# Patient Record
Sex: MALE | Race: WHITE | NOT HISPANIC OR LATINO | Employment: FULL TIME | URBAN - METROPOLITAN AREA
[De-identification: names, ages, dates, MRNs, and addresses within clinical notes are randomized per-mention and may not be internally consistent; named-entity substitution may affect disease eponyms.]

---

## 2017-01-03 ENCOUNTER — OFFICE VISIT - HEALTHEAST (OUTPATIENT)
Dept: PHYSICAL THERAPY | Facility: CLINIC | Age: 62
End: 2017-01-03

## 2017-01-03 DIAGNOSIS — G44.321 INTRACTABLE CHRONIC POST-TRAUMATIC HEADACHE: ICD-10-CM

## 2017-01-03 DIAGNOSIS — M54.2 CERVICALGIA: ICD-10-CM

## 2017-01-11 ENCOUNTER — OFFICE VISIT - HEALTHEAST (OUTPATIENT)
Dept: PHYSICAL THERAPY | Facility: CLINIC | Age: 62
End: 2017-01-11

## 2017-01-11 DIAGNOSIS — G44.321 INTRACTABLE CHRONIC POST-TRAUMATIC HEADACHE: ICD-10-CM

## 2017-01-11 DIAGNOSIS — M54.2 CERVICALGIA: ICD-10-CM

## 2017-01-11 DIAGNOSIS — F07.81 POST CONCUSSION SYNDROME: ICD-10-CM

## 2017-01-18 ENCOUNTER — OFFICE VISIT - HEALTHEAST (OUTPATIENT)
Dept: PHYSICAL THERAPY | Facility: CLINIC | Age: 62
End: 2017-01-18

## 2017-01-18 DIAGNOSIS — R51.9 HEAD PAIN, CHRONIC: ICD-10-CM

## 2017-01-18 DIAGNOSIS — F07.81 POST CONCUSSION SYNDROME: ICD-10-CM

## 2017-01-18 DIAGNOSIS — G89.29 CHRONIC NECK PAIN: ICD-10-CM

## 2017-01-18 DIAGNOSIS — M54.2 CHRONIC NECK PAIN: ICD-10-CM

## 2017-01-18 DIAGNOSIS — G89.29 HEAD PAIN, CHRONIC: ICD-10-CM

## 2017-01-18 DIAGNOSIS — M54.2 CERVICALGIA: ICD-10-CM

## 2017-01-18 DIAGNOSIS — G44.321 INTRACTABLE CHRONIC POST-TRAUMATIC HEADACHE: ICD-10-CM

## 2017-01-24 ENCOUNTER — OFFICE VISIT - HEALTHEAST (OUTPATIENT)
Dept: PHYSICAL THERAPY | Facility: CLINIC | Age: 62
End: 2017-01-24

## 2017-01-24 DIAGNOSIS — F07.81 POST CONCUSSION SYNDROME: ICD-10-CM

## 2017-01-24 DIAGNOSIS — M54.2 CERVICALGIA: ICD-10-CM

## 2017-01-24 DIAGNOSIS — G44.321 INTRACTABLE CHRONIC POST-TRAUMATIC HEADACHE: ICD-10-CM

## 2017-02-01 ENCOUNTER — OFFICE VISIT - HEALTHEAST (OUTPATIENT)
Dept: PHYSICAL THERAPY | Facility: CLINIC | Age: 62
End: 2017-02-01

## 2017-02-01 DIAGNOSIS — G44.321 INTRACTABLE CHRONIC POST-TRAUMATIC HEADACHE: ICD-10-CM

## 2017-02-01 DIAGNOSIS — F07.81 POST CONCUSSION SYNDROME: ICD-10-CM

## 2017-02-01 DIAGNOSIS — M54.2 CERVICALGIA: ICD-10-CM

## 2017-02-01 DIAGNOSIS — M54.2 CHRONIC NECK PAIN: ICD-10-CM

## 2017-02-01 DIAGNOSIS — G89.29 CHRONIC NECK PAIN: ICD-10-CM

## 2017-02-01 DIAGNOSIS — R51.9 HEAD PAIN, CHRONIC: ICD-10-CM

## 2017-02-01 DIAGNOSIS — G89.29 HEAD PAIN, CHRONIC: ICD-10-CM

## 2017-02-06 ENCOUNTER — OFFICE VISIT - HEALTHEAST (OUTPATIENT)
Dept: PHYSICAL THERAPY | Facility: CLINIC | Age: 62
End: 2017-02-06

## 2017-02-06 DIAGNOSIS — F07.81 POST CONCUSSION SYNDROME: ICD-10-CM

## 2017-02-06 DIAGNOSIS — R51.9 HEAD PAIN, CHRONIC: ICD-10-CM

## 2017-02-06 DIAGNOSIS — M54.2 CHRONIC NECK PAIN: ICD-10-CM

## 2017-02-06 DIAGNOSIS — G89.29 HEAD PAIN, CHRONIC: ICD-10-CM

## 2017-02-06 DIAGNOSIS — G89.29 CHRONIC NECK PAIN: ICD-10-CM

## 2017-02-06 DIAGNOSIS — G44.321 INTRACTABLE CHRONIC POST-TRAUMATIC HEADACHE: ICD-10-CM

## 2017-02-06 DIAGNOSIS — M54.2 CERVICALGIA: ICD-10-CM

## 2017-02-15 ENCOUNTER — OFFICE VISIT - HEALTHEAST (OUTPATIENT)
Dept: PHYSICAL THERAPY | Facility: CLINIC | Age: 62
End: 2017-02-15

## 2017-02-15 DIAGNOSIS — R51.9 HEAD PAIN, CHRONIC: ICD-10-CM

## 2017-02-15 DIAGNOSIS — M54.2 CERVICALGIA: ICD-10-CM

## 2017-02-15 DIAGNOSIS — G89.29 HEAD PAIN, CHRONIC: ICD-10-CM

## 2017-02-15 DIAGNOSIS — G44.321 INTRACTABLE CHRONIC POST-TRAUMATIC HEADACHE: ICD-10-CM

## 2017-02-15 DIAGNOSIS — F07.81 POST CONCUSSION SYNDROME: ICD-10-CM

## 2017-02-15 DIAGNOSIS — G89.29 CHRONIC NECK PAIN: ICD-10-CM

## 2017-02-15 DIAGNOSIS — M54.2 CHRONIC NECK PAIN: ICD-10-CM

## 2017-03-06 ENCOUNTER — OFFICE VISIT - HEALTHEAST (OUTPATIENT)
Dept: PHYSICAL THERAPY | Facility: CLINIC | Age: 62
End: 2017-03-06

## 2017-03-06 DIAGNOSIS — G89.29 CHRONIC NECK PAIN: ICD-10-CM

## 2017-03-06 DIAGNOSIS — G89.29 HEAD PAIN, CHRONIC: ICD-10-CM

## 2017-03-06 DIAGNOSIS — G44.321 INTRACTABLE CHRONIC POST-TRAUMATIC HEADACHE: ICD-10-CM

## 2017-03-06 DIAGNOSIS — M54.2 CHRONIC NECK PAIN: ICD-10-CM

## 2017-03-06 DIAGNOSIS — F07.81 POST CONCUSSION SYNDROME: ICD-10-CM

## 2017-03-06 DIAGNOSIS — M54.2 CERVICALGIA: ICD-10-CM

## 2017-03-06 DIAGNOSIS — R51.9 HEAD PAIN, CHRONIC: ICD-10-CM

## 2017-03-22 ENCOUNTER — OFFICE VISIT - HEALTHEAST (OUTPATIENT)
Dept: PHYSICAL THERAPY | Facility: CLINIC | Age: 62
End: 2017-03-22

## 2017-03-22 DIAGNOSIS — G89.29 CHRONIC NECK PAIN: ICD-10-CM

## 2017-03-22 DIAGNOSIS — M54.2 CERVICALGIA: ICD-10-CM

## 2017-03-22 DIAGNOSIS — F07.81 POST CONCUSSION SYNDROME: ICD-10-CM

## 2017-03-22 DIAGNOSIS — R51.9 HEAD PAIN, CHRONIC: ICD-10-CM

## 2017-03-22 DIAGNOSIS — G44.321 INTRACTABLE CHRONIC POST-TRAUMATIC HEADACHE: ICD-10-CM

## 2017-03-22 DIAGNOSIS — G89.29 HEAD PAIN, CHRONIC: ICD-10-CM

## 2017-03-22 DIAGNOSIS — M54.2 CHRONIC NECK PAIN: ICD-10-CM

## 2017-04-03 ENCOUNTER — OFFICE VISIT - HEALTHEAST (OUTPATIENT)
Dept: PHYSICAL THERAPY | Facility: CLINIC | Age: 62
End: 2017-04-03

## 2017-04-03 DIAGNOSIS — R51.9 HEAD PAIN, CHRONIC: ICD-10-CM

## 2017-04-03 DIAGNOSIS — M54.2 CHRONIC NECK PAIN: ICD-10-CM

## 2017-04-03 DIAGNOSIS — M54.2 CERVICALGIA: ICD-10-CM

## 2017-04-03 DIAGNOSIS — G89.29 HEAD PAIN, CHRONIC: ICD-10-CM

## 2017-04-03 DIAGNOSIS — F07.81 POST CONCUSSION SYNDROME: ICD-10-CM

## 2017-04-03 DIAGNOSIS — G44.321 INTRACTABLE CHRONIC POST-TRAUMATIC HEADACHE: ICD-10-CM

## 2017-04-03 DIAGNOSIS — G89.29 CHRONIC NECK PAIN: ICD-10-CM

## 2017-04-17 ENCOUNTER — OFFICE VISIT - HEALTHEAST (OUTPATIENT)
Dept: PHYSICAL THERAPY | Facility: CLINIC | Age: 62
End: 2017-04-17

## 2017-04-17 DIAGNOSIS — F07.81 POST CONCUSSION SYNDROME: ICD-10-CM

## 2017-04-17 DIAGNOSIS — G44.321 INTRACTABLE CHRONIC POST-TRAUMATIC HEADACHE: ICD-10-CM

## 2017-04-17 DIAGNOSIS — G89.29 HEAD PAIN, CHRONIC: ICD-10-CM

## 2017-04-17 DIAGNOSIS — M54.2 CERVICALGIA: ICD-10-CM

## 2017-04-17 DIAGNOSIS — G89.29 CHRONIC NECK PAIN: ICD-10-CM

## 2017-04-17 DIAGNOSIS — M54.2 CHRONIC NECK PAIN: ICD-10-CM

## 2017-04-17 DIAGNOSIS — R51.9 HEAD PAIN, CHRONIC: ICD-10-CM

## 2017-06-14 ENCOUNTER — OFFICE VISIT - HEALTHEAST (OUTPATIENT)
Dept: PHYSICAL THERAPY | Facility: CLINIC | Age: 62
End: 2017-06-14

## 2017-06-14 DIAGNOSIS — M54.2 CERVICALGIA: ICD-10-CM

## 2017-06-14 DIAGNOSIS — F07.81 POST CONCUSSION SYNDROME: ICD-10-CM

## 2017-06-14 DIAGNOSIS — G44.319 ACUTE POST-TRAUMATIC HEADACHE, NOT INTRACTABLE: ICD-10-CM

## 2017-06-14 DIAGNOSIS — G89.29 CHRONIC NECK PAIN: ICD-10-CM

## 2017-06-14 DIAGNOSIS — G89.29 HEAD PAIN, CHRONIC: ICD-10-CM

## 2017-06-14 DIAGNOSIS — M54.2 CHRONIC NECK PAIN: ICD-10-CM

## 2017-06-14 DIAGNOSIS — R51.9 HEAD PAIN, CHRONIC: ICD-10-CM

## 2017-06-19 ENCOUNTER — OFFICE VISIT - HEALTHEAST (OUTPATIENT)
Dept: PHYSICAL THERAPY | Facility: CLINIC | Age: 62
End: 2017-06-19

## 2017-06-19 DIAGNOSIS — G89.29 CHRONIC NECK PAIN: ICD-10-CM

## 2017-06-19 DIAGNOSIS — M54.2 CERVICALGIA: ICD-10-CM

## 2017-06-19 DIAGNOSIS — F07.81 POST CONCUSSION SYNDROME: ICD-10-CM

## 2017-06-19 DIAGNOSIS — G44.319 ACUTE POST-TRAUMATIC HEADACHE, NOT INTRACTABLE: ICD-10-CM

## 2017-06-19 DIAGNOSIS — M54.2 CHRONIC NECK PAIN: ICD-10-CM

## 2017-07-12 ENCOUNTER — OFFICE VISIT - HEALTHEAST (OUTPATIENT)
Dept: PHYSICAL THERAPY | Facility: REHABILITATION | Age: 62
End: 2017-07-12

## 2017-07-12 DIAGNOSIS — G44.319 ACUTE POST-TRAUMATIC HEADACHE, NOT INTRACTABLE: ICD-10-CM

## 2017-07-12 DIAGNOSIS — M54.2 CERVICALGIA: ICD-10-CM

## 2017-07-12 DIAGNOSIS — G89.29 HEAD PAIN, CHRONIC: ICD-10-CM

## 2017-07-12 DIAGNOSIS — R51.9 HEAD PAIN, CHRONIC: ICD-10-CM

## 2017-07-12 DIAGNOSIS — M54.2 CHRONIC NECK PAIN: ICD-10-CM

## 2017-07-12 DIAGNOSIS — F07.81 POST CONCUSSION SYNDROME: ICD-10-CM

## 2017-07-12 DIAGNOSIS — G89.29 CHRONIC NECK PAIN: ICD-10-CM

## 2017-07-26 ENCOUNTER — OFFICE VISIT - HEALTHEAST (OUTPATIENT)
Dept: PHYSICAL THERAPY | Facility: REHABILITATION | Age: 62
End: 2017-07-26

## 2017-07-26 DIAGNOSIS — G89.29 CHRONIC NECK PAIN: ICD-10-CM

## 2017-07-26 DIAGNOSIS — M54.2 CERVICALGIA: ICD-10-CM

## 2017-07-26 DIAGNOSIS — G89.29 HEAD PAIN, CHRONIC: ICD-10-CM

## 2017-07-26 DIAGNOSIS — F07.81 POST CONCUSSION SYNDROME: ICD-10-CM

## 2017-07-26 DIAGNOSIS — R51.9 HEAD PAIN, CHRONIC: ICD-10-CM

## 2017-07-26 DIAGNOSIS — M54.2 CHRONIC NECK PAIN: ICD-10-CM

## 2017-08-10 ENCOUNTER — OFFICE VISIT - HEALTHEAST (OUTPATIENT)
Dept: PHYSICAL THERAPY | Facility: REHABILITATION | Age: 62
End: 2017-08-10

## 2017-08-10 DIAGNOSIS — M54.2 CHRONIC NECK PAIN: ICD-10-CM

## 2017-08-10 DIAGNOSIS — G44.221 CHRONIC TENSION-TYPE HEADACHE, INTRACTABLE: ICD-10-CM

## 2017-08-10 DIAGNOSIS — G44.321 INTRACTABLE CHRONIC POST-TRAUMATIC HEADACHE: ICD-10-CM

## 2017-08-10 DIAGNOSIS — M54.2 CERVICALGIA: ICD-10-CM

## 2017-08-10 DIAGNOSIS — R51.9 HEAD PAIN, CHRONIC: ICD-10-CM

## 2017-08-10 DIAGNOSIS — G89.29 HEAD PAIN, CHRONIC: ICD-10-CM

## 2017-08-10 DIAGNOSIS — F07.81 POST CONCUSSION SYNDROME: ICD-10-CM

## 2017-08-10 DIAGNOSIS — G89.29 CHRONIC NECK PAIN: ICD-10-CM

## 2017-09-06 ENCOUNTER — OFFICE VISIT - HEALTHEAST (OUTPATIENT)
Dept: PHYSICAL THERAPY | Facility: REHABILITATION | Age: 62
End: 2017-09-06

## 2017-09-06 DIAGNOSIS — F07.81 POST CONCUSSION SYNDROME: ICD-10-CM

## 2017-09-06 DIAGNOSIS — G89.29 HEAD PAIN, CHRONIC: ICD-10-CM

## 2017-09-06 DIAGNOSIS — G89.29 CHRONIC NECK PAIN: ICD-10-CM

## 2017-09-06 DIAGNOSIS — M54.2 CERVICALGIA: ICD-10-CM

## 2017-09-06 DIAGNOSIS — R51.9 HEAD PAIN, CHRONIC: ICD-10-CM

## 2017-09-06 DIAGNOSIS — G44.321 INTRACTABLE CHRONIC POST-TRAUMATIC HEADACHE: ICD-10-CM

## 2017-09-06 DIAGNOSIS — M54.2 CHRONIC NECK PAIN: ICD-10-CM

## 2017-09-20 ENCOUNTER — OFFICE VISIT - HEALTHEAST (OUTPATIENT)
Dept: PHYSICAL THERAPY | Facility: REHABILITATION | Age: 62
End: 2017-09-20

## 2017-09-20 DIAGNOSIS — G44.321 INTRACTABLE CHRONIC POST-TRAUMATIC HEADACHE: ICD-10-CM

## 2017-09-20 DIAGNOSIS — F07.81 POST CONCUSSION SYNDROME: ICD-10-CM

## 2017-09-20 DIAGNOSIS — R51.9 HEAD PAIN, CHRONIC: ICD-10-CM

## 2017-09-20 DIAGNOSIS — M54.2 CERVICALGIA: ICD-10-CM

## 2017-09-20 DIAGNOSIS — M54.2 CHRONIC NECK PAIN: ICD-10-CM

## 2017-09-20 DIAGNOSIS — G89.29 HEAD PAIN, CHRONIC: ICD-10-CM

## 2017-09-20 DIAGNOSIS — G89.29 CHRONIC NECK PAIN: ICD-10-CM

## 2017-10-04 ENCOUNTER — OFFICE VISIT - HEALTHEAST (OUTPATIENT)
Dept: PHYSICAL THERAPY | Facility: REHABILITATION | Age: 62
End: 2017-10-04

## 2017-10-04 DIAGNOSIS — G89.29 HEAD PAIN, CHRONIC: ICD-10-CM

## 2017-10-04 DIAGNOSIS — G89.29 CHRONIC NECK PAIN: ICD-10-CM

## 2017-10-04 DIAGNOSIS — G44.321 INTRACTABLE CHRONIC POST-TRAUMATIC HEADACHE: ICD-10-CM

## 2017-10-04 DIAGNOSIS — M54.2 CHRONIC NECK PAIN: ICD-10-CM

## 2017-10-04 DIAGNOSIS — F07.81 POST CONCUSSION SYNDROME: ICD-10-CM

## 2017-10-04 DIAGNOSIS — R51.9 HEAD PAIN, CHRONIC: ICD-10-CM

## 2017-10-04 DIAGNOSIS — M54.2 CERVICALGIA: ICD-10-CM

## 2017-10-16 ENCOUNTER — OFFICE VISIT - HEALTHEAST (OUTPATIENT)
Dept: PHYSICAL THERAPY | Facility: REHABILITATION | Age: 62
End: 2017-10-16

## 2017-10-16 DIAGNOSIS — G89.29 HEAD PAIN, CHRONIC: ICD-10-CM

## 2017-10-16 DIAGNOSIS — M54.2 CHRONIC NECK PAIN: ICD-10-CM

## 2017-10-16 DIAGNOSIS — G89.29 CHRONIC NECK PAIN: ICD-10-CM

## 2017-10-16 DIAGNOSIS — G44.221 CHRONIC TENSION-TYPE HEADACHE, INTRACTABLE: ICD-10-CM

## 2017-10-16 DIAGNOSIS — F07.81 POST CONCUSSION SYNDROME: ICD-10-CM

## 2017-10-16 DIAGNOSIS — M54.2 CERVICALGIA: ICD-10-CM

## 2017-10-16 DIAGNOSIS — R51.9 HEAD PAIN, CHRONIC: ICD-10-CM

## 2017-11-01 ENCOUNTER — OFFICE VISIT - HEALTHEAST (OUTPATIENT)
Dept: PHYSICAL THERAPY | Facility: REHABILITATION | Age: 62
End: 2017-11-01

## 2017-11-01 DIAGNOSIS — G89.29 CHRONIC NECK PAIN: ICD-10-CM

## 2017-11-01 DIAGNOSIS — M54.2 CHRONIC NECK PAIN: ICD-10-CM

## 2017-11-01 DIAGNOSIS — F07.81 POST CONCUSSION SYNDROME: ICD-10-CM

## 2017-11-01 DIAGNOSIS — M54.2 CERVICALGIA: ICD-10-CM

## 2017-11-01 DIAGNOSIS — G89.29 HEAD PAIN, CHRONIC: ICD-10-CM

## 2017-11-01 DIAGNOSIS — R51.9 HEAD PAIN, CHRONIC: ICD-10-CM

## 2017-11-01 DIAGNOSIS — G44.221 CHRONIC TENSION-TYPE HEADACHE, INTRACTABLE: ICD-10-CM

## 2017-11-15 ENCOUNTER — OFFICE VISIT - HEALTHEAST (OUTPATIENT)
Dept: PHYSICAL THERAPY | Facility: REHABILITATION | Age: 62
End: 2017-11-15

## 2017-11-15 DIAGNOSIS — G44.319 ACUTE POST-TRAUMATIC HEADACHE, NOT INTRACTABLE: ICD-10-CM

## 2017-11-15 DIAGNOSIS — F07.81 POST CONCUSSION SYNDROME: ICD-10-CM

## 2017-11-15 DIAGNOSIS — M54.50 CHRONIC BILATERAL LOW BACK PAIN WITHOUT SCIATICA: ICD-10-CM

## 2017-11-15 DIAGNOSIS — M54.2 CERVICALGIA: ICD-10-CM

## 2017-11-15 DIAGNOSIS — G89.29 CHRONIC BILATERAL LOW BACK PAIN WITHOUT SCIATICA: ICD-10-CM

## 2017-11-29 ENCOUNTER — OFFICE VISIT - HEALTHEAST (OUTPATIENT)
Dept: PHYSICAL THERAPY | Facility: REHABILITATION | Age: 62
End: 2017-11-29

## 2017-11-29 DIAGNOSIS — G89.29 CHRONIC BILATERAL LOW BACK PAIN WITHOUT SCIATICA: ICD-10-CM

## 2017-11-29 DIAGNOSIS — F07.81 POST CONCUSSION SYNDROME: ICD-10-CM

## 2017-11-29 DIAGNOSIS — M54.2 CHRONIC NECK PAIN: ICD-10-CM

## 2017-11-29 DIAGNOSIS — M54.50 CHRONIC BILATERAL LOW BACK PAIN WITHOUT SCIATICA: ICD-10-CM

## 2017-11-29 DIAGNOSIS — M54.2 CERVICALGIA: ICD-10-CM

## 2017-11-29 DIAGNOSIS — G44.319 ACUTE POST-TRAUMATIC HEADACHE, NOT INTRACTABLE: ICD-10-CM

## 2017-11-29 DIAGNOSIS — G44.321 INTRACTABLE CHRONIC POST-TRAUMATIC HEADACHE: ICD-10-CM

## 2017-11-29 DIAGNOSIS — G89.29 CHRONIC NECK PAIN: ICD-10-CM

## 2018-02-14 ENCOUNTER — MEDICAL CORRESPONDENCE (OUTPATIENT)
Dept: HEALTH INFORMATION MANAGEMENT | Facility: CLINIC | Age: 63
End: 2018-02-14

## 2018-03-29 ENCOUNTER — TELEPHONE (OUTPATIENT)
Dept: ORTHOPEDICS | Facility: CLINIC | Age: 63
End: 2018-03-29

## 2018-03-29 NOTE — TELEPHONE ENCOUNTER
Returned patients call regarding his request for updated orthotics order. He was told that an updated order was signed and scanned into his chart last month. He stated that it ended up being an insurance issue with his orthotics clinic and they were able to figure it out. He will follow up in clinic in May as scheduled.

## 2018-04-27 NOTE — TELEPHONE ENCOUNTER
FUTURE VISIT INFORMATION      FUTURE VISIT INFORMATION:    Date: 5/2/18    Time:  8:00    Location: Ortho - [facility]  REFERRAL INFORMATION:    Referring provider:  Self    Referring providers clinic:  N/A - Seen by Dr. Laguna     Reason for visit/diagnosis  Right ankle subtalar joint OA     No outside records per pt    RECORDS REQUESTED FROM:       Clinic name Comments Records Status Imaging Status   [facility] - Ortho  [facility] - 5/14/13 , 5/24/11 Internal    [facility] - [facility] - 12/18/14 , 9/12/13 Internal     XRay 12/18/14  Internal     RECORDS STATUS

## 2018-04-30 ASSESSMENT — ENCOUNTER SYMPTOMS
BLOATING: 0
DIARRHEA: 0
LOSS OF CONSCIOUSNESS: 0
EYE PAIN: 1
NUMBNESS: 0
INSOMNIA: 1
VOMITING: 0
DEPRESSION: 1
STIFFNESS: 1
WEAKNESS: 1
FATIGUE: 1
ALTERED TEMPERATURE REGULATION: 0
FEVER: 0
TREMORS: 1
SNORES LOUDLY: 1
CONSTIPATION: 0
DECREASED APPETITE: 0
ABDOMINAL PAIN: 1
HALLUCINATIONS: 0
DIZZINESS: 1
POLYDIPSIA: 0
POSTURAL DYSPNEA: 1
PANIC: 0
WEIGHT GAIN: 1
RECTAL PAIN: 0
DOUBLE VISION: 0
JAUNDICE: 0
MYALGIAS: 1
WHEEZING: 1
BACK PAIN: 1
EYE REDNESS: 0
WEIGHT LOSS: 0
EYE WATERING: 0
ARTHRALGIAS: 1
SHORTNESS OF BREATH: 1
COUGH: 1
NIGHT SWEATS: 0
HEADACHES: 1
PARALYSIS: 0
DECREASED CONCENTRATION: 1
SPUTUM PRODUCTION: 0
HEARTBURN: 1
BOWEL INCONTINENCE: 0
BRUISES/BLEEDS EASILY: 0
JOINT SWELLING: 1
COUGH DISTURBING SLEEP: 1
MUSCLE WEAKNESS: 1
CHILLS: 0
TINGLING: 0
NERVOUS/ANXIOUS: 1
INCREASED ENERGY: 1
POLYPHAGIA: 0
BLOOD IN STOOL: 0
SPEECH CHANGE: 0
SEIZURES: 0
EYE IRRITATION: 1
MUSCLE CRAMPS: 1
NAUSEA: 0
MEMORY LOSS: 0
NECK PAIN: 1
DISTURBANCES IN COORDINATION: 1
DYSPNEA ON EXERTION: 1
HEMOPTYSIS: 0
SWOLLEN GLANDS: 1

## 2018-05-02 ENCOUNTER — OFFICE VISIT (OUTPATIENT)
Dept: ORTHOPEDICS | Facility: CLINIC | Age: 63
End: 2018-05-02
Payer: OTHER MISCELLANEOUS

## 2018-05-02 ENCOUNTER — RADIANT APPOINTMENT (OUTPATIENT)
Dept: GENERAL RADIOLOGY | Facility: CLINIC | Age: 63
End: 2018-05-02
Attending: PHYSICIAN ASSISTANT
Payer: OTHER MISCELLANEOUS

## 2018-05-02 ENCOUNTER — PRE VISIT (OUTPATIENT)
Dept: ORTHOPEDICS | Facility: CLINIC | Age: 63
End: 2018-05-02

## 2018-05-02 DIAGNOSIS — M19.071 OSTEOARTHRITIS OF RIGHT ANKLE OR FOOT: ICD-10-CM

## 2018-05-02 DIAGNOSIS — M25.571 CHRONIC PAIN OF RIGHT ANKLE: ICD-10-CM

## 2018-05-02 DIAGNOSIS — G89.29 CHRONIC PAIN OF RIGHT ANKLE: ICD-10-CM

## 2018-05-02 DIAGNOSIS — G89.29 CHRONIC PAIN OF RIGHT ANKLE: Primary | ICD-10-CM

## 2018-05-02 DIAGNOSIS — M25.571 CHRONIC PAIN OF RIGHT ANKLE: Primary | ICD-10-CM

## 2018-05-02 RX ORDER — SODIUM PHOSPHATE,MONO-DIBASIC 19G-7G/118
ENEMA (ML) RECTAL
COMMUNITY
End: 2024-03-18

## 2018-05-02 RX ORDER — FINASTERIDE 5 MG/1
5 TABLET, FILM COATED ORAL
COMMUNITY
Start: 2017-10-03 | End: 2024-03-18

## 2018-05-02 RX ORDER — OMEGA-3 FATTY ACIDS/FISH OIL 300-1000MG
4 CAPSULE ORAL
COMMUNITY

## 2018-05-02 RX ORDER — MULTIVITAMIN
TABLET ORAL
COMMUNITY

## 2018-05-02 NOTE — LETTER
5/2/2018       RE: Ruth Ann French  [address] [place] [address]     Dear Colleague,    Thank you for referring your patient, Ruth Ann French, to the [facility] at [facility]. Please see a copy of my visit note below.    Chief Complaint:   Chief Complaint   Patient presents with     Consult     Pain, right ankle. Degenerative arthritis in right hip. Pt see Dr. Jase Campbell.      Subjective: Ruth Ann is a 62 year old male who presents to the clinic today for evaluation of chronic R ankle pain and instability. Initial injury occurred 1986, multiple fractures to ankle and foot. Surgical repair was performed. 1993 had R ankle inversion injury requiring surgical repair. Then in 1997 had achilles tendon repair to R. He has since had a chronic limp which has created arthritis and pain in both hips. The ankle is unstable and intermittently painful. Pain is worsening slowly over the years. Takes ibuprofen or tylenol #3 when needed. Ice occasionally. Needs new lace up brace. His custom orthotics are working well. Follows with his PCP for pain management. Has not had ankle injections. Pain patches work well.     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  No family history on file.  Social History   Substance Use Topics     Smoking status: Never Smoker     Smokeless tobacco: Not on file     Alcohol use Not on file     Allergies   Allergen Reactions     Diclofenac Swelling     Voltaren Swelling     Wheat Bran Cough     Possibility of Sensitivity     Current  Outpatient Rx   Medication Sig Dispense Refill     finasteride (PROSCAR) 5 MG tablet Take 5 mg by mouth       Acetaminophen-Codeine (TYLENOL WITH CODEINE #3 PO)        glucosamine-chondroitin 500-400 MG CAPS per capsule 1 Cap daily.       Multiple vitamin TABS        omega 3 1000 MG CAPS Take 4 g by mouth       ORDER FOR DME Lace up Ankle brace for rt ankle/foot pain 1 Units 0       Objective:   There were no vitals taken for this visit.    General: Patient is well groomed, appears stated age, is alert, cooperative and in no acute distress.  Vascular: DP and PT pulses are 1/4 bilaterally. LE capillary refill time is <3 seconds. Normal pedal hair. Mild edema noted to lateral and medial ankle.  MSK: No pain with active or passive ROM of the ankle, MTJ, 1st ray or halluces bilaterally. Shooting pain reproduced with passive ankle inversion. Equinus mild to R ankle. Tenderness to palpation of R sinus tarsi and peroneal tendon.   Neurologic: Gross sensation intact to bilateral LE.   Skin: LE skin color, texture and turgor are normal. Toenails are thickened bilaterally. No open lesions. No ecchymosis or erythema.    Right Ankle X-Rays 5/2/18  Impression:  1. No acute osseous abnormality.  2. Mild talonavicular and tibiotalar degenerative changes.    Assessment:   - Chronic R ankle pain secondary to ankle and subtalar joint OA    Plan:   - Pt seen and evaluated. Diagnosis and treatment options were discussed.   - X-Rays of R ankle were performed, independently interpreted by me and discussed with patient.   - Dispensed lace up ankle brace  - Continue with orthotics  - Patient would prefer pain management to be performed by his PCP  - Pt to return to clinic as needed        Again, thank you for allowing me to participate in the care of your patient.      Sincerely,    Bernice Castillo PA-C

## 2018-05-02 NOTE — PROGRESS NOTES
Chief Complaint:   Chief Complaint   Patient presents with     Consult     Pain, right ankle. Degenerative arthritis in right hip. Pt see Dr. Laguna amd Dr. Capmbell.      Subjective: Ruth Ann is a 62 year old male who presents to the clinic today for evaluation of chronic R ankle pain and instability. Initial injury occurred 1986, multiple fractures to ankle and foot. Surgical repair was performed. 1993 had R ankle inversion injury requiring surgical repair. Then in 1997 had achilles tendon repair to R. He has since had a chronic limp which has created arthritis and pain in both hips. The ankle is unstable and intermittently painful. Pain is worsening slowly over the years. Takes ibuprofen or tylenol #3 when needed. Ice occasionally. Needs new lace up brace. His custom orthotics are working well. Follows with his PCP for pain management. Has not had ankle injections. Pain patches work well.     ROS: Answers for HPI/ROS submitted by the patient on 4/30/2018   General Symptoms: Yes  Skin Symptoms: No  HENT Symptoms: No  EYE SYMPTOMS: Yes  HEART SYMPTOMS: No  LUNG SYMPTOMS: Yes  INTESTINAL SYMPTOMS: Yes  URINARY SYMPTOMS: No  REPRODUCTIVE SYMPTOMS: No  SKELETAL SYMPTOMS: Yes  BLOOD SYMPTOMS: Yes  NERVOUS SYSTEM SYMPTOMS: Yes  MENTAL HEALTH SYMPTOMS: Yes  Fever: No  Loss of appetite: No  Weight loss: No  Weight gain: Yes  Fatigue: Yes  Night sweats: No  Chills: No  Increased stress: Yes  Excessive hunger: No  Excessive thirst: No  Feeling hot or cold when others believe the temperature is normal: No  Loss of height: Yes  Post-operative complications: No  Surgical site pain: Yes  Hallucinations: No  Change in or Loss of Energy: Yes  Hyperactivity: Yes  Confusion: Yes  Eye pain: Yes  Vision loss: Yes  Dry eyes: No  Watery eyes: No  Eye bulging: No  Double vision: No  Flashing of lights: No  Spots: No  Floaters: No  Redness: No  Crossed eyes: No  Tunnel Vision: No  Yellowing of eyes: No  Eye irritation: Yes  Cough: Yes  Sputum  or phlegm: No  Coughing up blood: No  Difficulty breating or shortness of breath: Yes  Snoring: Yes  Wheezing: Yes  Difficulty breathing on exertion: Yes  Nighttime Cough: Yes  Difficulty breathing when lying flat: Yes  Heart burn or indigestion: Yes  Nausea: No  Vomiting: No  Abdominal pain: Yes  Bloating: No  Constipation: No  Diarrhea: No  Blood in stool: No  Black stools: No  Rectal or Anal pain: No  Fecal incontinence: No  Yellowing of skin or eyes: No  Vomit with blood: No  Change in stools: No  Back pain: Yes  Muscle aches: Yes  Neck pain: Yes  Swollen joints: Yes  Joint pain: Yes  Bone pain: No  Muscle cramps: Yes  Muscle weakness: Yes  Joint stiffness: Yes  Bone fracture: No  Anemia: No  Swollen glands: Yes  Easy bleeding or bruising: No  Edema or swelling: No  Trouble with coordination: Yes  Dizziness or trouble with balance: Yes  Fainting or black-out spells: No  Memory loss: No  Headache: Yes  Seizures: No  Speech problems: No  Tingling: No  Tremor: Yes  Weakness: Yes  Difficulty walking: Yes  Paralysis: No  Numbness: No  Nervous or Anxious: Yes  Depression: Yes  Trouble sleeping: Yes  Trouble thinking or concentrating: Yes  Mood changes: Yes  Panic attacks: No    Past Medical History:   Diagnosis Date     Depressive disorder 9/28/2015    complication from TBI     Neck injuries 9/28/2015    Fall on concrete, TBI, face and neck injury     Reduced vision 9/28/2015    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  No family history on file.  Social History   Substance Use Topics     Smoking status: Never Smoker     Smokeless tobacco: Not on file     Alcohol use Not on file     Allergies   Allergen Reactions     Diclofenac Swelling     Voltaren Swelling     Wheat Bran Cough     Possibility of Sensitivity     Current  Outpatient Rx   Medication Sig Dispense Refill     finasteride (PROSCAR) 5 MG tablet Take 5 mg by mouth       Acetaminophen-Codeine (TYLENOL WITH CODEINE #3 PO)        glucosamine-chondroitin 500-400 MG CAPS per capsule 1 Cap daily.       Multiple vitamin TABS        omega 3 1000 MG CAPS Take 4 g by mouth       ORDER FOR DME Lace up Ankle brace for rt ankle/foot pain 1 Units 0       Objective:   There were no vitals taken for this visit.    General: Patient is well groomed, appears stated age, is alert, cooperative and in no acute distress.  Vascular: DP and PT pulses are 1/4 bilaterally. LE capillary refill time is <3 seconds. Normal pedal hair. Mild edema noted to lateral and medial ankle.  MSK: No pain with active or passive ROM of the ankle, MTJ, 1st ray or halluces bilaterally. Shooting pain reproduced with passive ankle inversion. Equinus mild to R ankle. Tenderness to palpation of R sinus tarsi and peroneal tendon.   Neurologic: Gross sensation intact to bilateral LE.   Skin: LE skin color, texture and turgor are normal. Toenails are thickened bilaterally. No open lesions. No ecchymosis or erythema.    Right Ankle X-Rays 5/2/18  Impression:  1. No acute osseous abnormality.  2. Mild talonavicular and tibiotalar degenerative changes.    Assessment:   - Chronic R ankle pain secondary to ankle and subtalar joint OA    Plan:   - Pt seen and evaluated. Diagnosis and treatment options were discussed.   - X-Rays of R ankle were performed, independently interpreted by me and discussed with patient.   - Dispensed lace up ankle brace  - Continue with orthotics  - Patient would prefer pain management to be performed by his PCP  - Pt to return to clinic as needed

## 2018-05-02 NOTE — NURSING NOTE
Reason For Visit:   Chief Complaint   Patient presents with     Consult     Pain, right ankle. Degenerative arthritis in right hip. Pt see Dr. Jase Campbell.        Pain Assessment  Patient Currently in Pain: Yes  Primary Pain Location: Ankle  Pain Orientation: Right  Pain Descriptors: Constant  Aggravating Factors:  (Pt stated that the pain woke him up at night. )            Current Outpatient Prescriptions   Medication Sig Dispense Refill     finasteride (PROSCAR) 5 MG tablet Take 5 mg by mouth       Acetaminophen-Codeine (TYLENOL WITH CODEINE #3 PO)        glucosamine-chondroitin 500-400 MG CAPS per capsule 1 Cap daily.       Multiple vitamin TABS        omega 3 1000 MG CAPS Take 4 g by mouth       ORDER FOR DME Lace up Ankle brace for rt ankle/foot pain 1 Units 0          Allergies   Allergen Reactions     Diclofenac Swelling     Voltaren Swelling     Wheat Bran Cough     Possibility of Sensitivity

## 2018-05-02 NOTE — MR AVS SNAPSHOT
After Visit Summary   5/2/2018    Ruth Ann French    MRN: [id number]           Patient Information     Date Of Birth          1955        Visit Information        Provider Department      5/2/2018 8:00 AM Bernice Catsillo PA-C [facility]        Today's Diagnoses     Chronic pain of right ankle    -  1    Osteoarthritis of right ankle or foot           Follow-ups after your visit        Who to contact     Please call your clinic at [phone] to:    Ask questions about your health    Make or cancel appointments    Discuss your medicines    Learn about your test results    Speak to your doctor            Additional Information About Your Visit        MyChart Information     [facility] gives you secure access to your electronic health record. If you see a primary care provider, you can also send messages to your care team and make appointments. If you have questions, please call your primary care clinic.  If you do not have a primary care provider, please call [phone] and they will assist you.      [facility] is an electronic gateway that provides easy, online access to your medical records. With [facility], you can request a clinic appointment, read your test results, renew a prescription or communicate with your care team.     To access your existing account, please contact your [facility] or call [phone] for assistance.        Care EveryWhere ID     This is your Care EveryWhere ID. This could be used by other organizations to access your [facility] medical records  [id number]         Blood Pressure from Last 3 Encounters:   No data found for BP    Weight from Last 3 Encounters:   12/18/14 109.8 kg (242 lb)   09/12/13 112.5 kg (248 lb)   05/14/13 112 kg (247 lb)               Primary Care Provider Office Phone # Fax #    Socrates Aquino [phone]       [facility] [address] [address]        Equal  Access to Services     [facility] Hospital[facility] : Hadii aad ku hadhermila Pantoja, wakellenda luqadaha, qamireyata kaerastoalexis james. So [facility] [phone].    ATENCIÓN: Si habla español, tiene a brasher disposición servicios gratuitos de asistencia lingüística. Llame al [phone].    We comply with applicable federal civil rights laws and [place] laws. We do not discriminate on the basis of race, color, national origin, age, disability, sex, sexual orientation, or gender identity.            Thank you!     Thank you for choosing [facility]  for your care. Our goal is always to provide you with excellent care. Hearing back from our patients is one way we can continue to improve our services. Please take a few minutes to complete the written survey that you may receive in the mail after your visit with us. Thank you!             Your Updated Medication List - Protect others around you: Learn how to safely use, store and throw away your medicines at www.disposemymeds.org.          This list is accurate as of 5/2/18 12:19 PM.  Always use your most recent med list.                   Brand Name Dispense Instructions for use Diagnosis    finasteride 5 MG tablet    PROSCAR     Take 5 mg by mouth        glucosamine-chondroitin 500-400 MG Caps per capsule      1 Cap daily.        Multiple vitamin Tabs           omega 3 1000 MG Caps      Take 4 g by mouth        order for DME     1 Units    Lace up Ankle brace for rt ankle/foot pain    Right ankle pain       TYLENOL WITH CODEINE #3 PO

## 2019-01-16 ENCOUNTER — TELEPHONE (OUTPATIENT)
Dept: ORTHOPEDICS | Facility: CLINIC | Age: 64
End: 2019-01-16

## 2019-01-16 DIAGNOSIS — M19.071 PRIMARY OSTEOARTHRITIS OF RIGHT ANKLE: Primary | ICD-10-CM

## 2019-01-16 NOTE — TELEPHONE ENCOUNTER
Health Call Center    Phone Message    May a detailed message be left on voicemail: no    Reason for Call: Other: Evangelina stated that Pt is requesting for Dr. Laguna to send in an order Foot Inserts. Pt told Evangelina to reach out to Dr. Laguna to have that order faxed over. Please call the clinic with any questions and fax order to: [phone].      Action Taken: Message routed to:  [facility] Clinic

## 2019-01-18 ENCOUNTER — TELEPHONE (OUTPATIENT)
Dept: ORTHOPEDICS | Facility: CLINIC | Age: 64
End: 2019-01-18

## 2019-01-18 NOTE — TELEPHONE ENCOUNTER
I called the patient this morning regarding his order request for orthotics. I told him that since  He hasn't seen Dr. Laguna in 5 years but he did recently see Bernice Castillo who refilled his order for orthotics. I let him know that I had faxed the order to the number he provided and he should be able to get them filled now.    Tracey Hernandez, ATC

## 2019-01-23 DIAGNOSIS — M25.571 CHRONIC PAIN OF RIGHT ANKLE: Primary | ICD-10-CM

## 2019-01-23 DIAGNOSIS — G89.29 CHRONIC PAIN OF RIGHT ANKLE: Primary | ICD-10-CM

## 2019-04-10 ENCOUNTER — TELEPHONE (OUTPATIENT)
Dept: ORTHOPEDICS | Facility: CLINIC | Age: 64
End: 2019-04-10

## 2019-04-10 ENCOUNTER — APPOINTMENT (OUTPATIENT)
Dept: CT IMAGING | Facility: CLINIC | Age: 64
End: 2019-04-10
Attending: EMERGENCY MEDICINE
Payer: COMMERCIAL

## 2019-04-10 ENCOUNTER — APPOINTMENT (OUTPATIENT)
Dept: GENERAL RADIOLOGY | Facility: CLINIC | Age: 64
End: 2019-04-10
Attending: EMERGENCY MEDICINE
Payer: COMMERCIAL

## 2019-04-10 ENCOUNTER — HOSPITAL ENCOUNTER (EMERGENCY)
Facility: CLINIC | Age: 64
Discharge: HOME | End: 2019-04-10
Attending: EMERGENCY MEDICINE | Admitting: EMERGENCY MEDICINE
Payer: COMMERCIAL

## 2019-04-10 VITALS
RESPIRATION RATE: 18 BRPM | OXYGEN SATURATION: 94 % | WEIGHT: 244 LBS | HEART RATE: 52 BPM | TEMPERATURE: 98.1 F | BODY MASS INDEX: 33.05 KG/M2 | SYSTOLIC BLOOD PRESSURE: 118 MMHG | DIASTOLIC BLOOD PRESSURE: 62 MMHG | HEIGHT: 72 IN
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
## 2019-04-10 DIAGNOSIS — S82.54XA CLOSED NONDISPLACED FRACTURE OF MEDIAL MALLEOLUS OF RIGHT TIBIA, INITIAL ENCOUNTER: ICD-10-CM

## 2019-04-10 PROCEDURE — 73630 X-RAY EXAM OF FOOT: CPT | Mod: RT

## 2019-04-10 PROCEDURE — 25000132 ZZH RX MED GY IP 250 OP 250 PS 637: Performed by: EMERGENCY MEDICINE

## 2019-04-10 PROCEDURE — 29515 APPLICATION SHORT LEG SPLINT: CPT | Performed by: EMERGENCY MEDICINE

## 2019-04-10 PROCEDURE — 99285 EMERGENCY DEPT VISIT HI MDM: CPT | Mod: Z6 | Performed by: EMERGENCY MEDICINE

## 2019-04-10 PROCEDURE — 73560 X-RAY EXAM OF KNEE 1 OR 2: CPT | Mod: RT

## 2019-04-10 PROCEDURE — 73610 X-RAY EXAM OF ANKLE: CPT | Mod: RT

## 2019-04-10 PROCEDURE — 70450 CT HEAD/BRAIN W/O DYE: CPT

## 2019-04-10 PROCEDURE — 99285 EMERGENCY DEPT VISIT HI MDM: CPT | Mod: 25 | Performed by: EMERGENCY MEDICINE

## 2019-04-10 PROCEDURE — 73590 X-RAY EXAM OF LOWER LEG: CPT | Mod: RT

## 2019-04-10 PROCEDURE — 72125 CT NECK SPINE W/O DYE: CPT

## 2019-04-10 RX ORDER — OXYCODONE HYDROCHLORIDE 5 MG/1
5 TABLET ORAL ONCE
Status: COMPLETED | OUTPATIENT
Start: 2019-04-10 | End: 2019-04-10

## 2019-04-10 RX ORDER — ACETAMINOPHEN 325 MG/1
975 TABLET ORAL ONCE
Status: COMPLETED | OUTPATIENT
Start: 2019-04-10 | End: 2019-04-10

## 2019-04-10 RX ADMIN — ACETAMINOPHEN 975 MG: 325 TABLET, FILM COATED ORAL at 10:14

## 2019-04-10 RX ADMIN — OXYCODONE HYDROCHLORIDE 5 MG: 5 TABLET ORAL at 10:12

## 2019-04-10 ASSESSMENT — ENCOUNTER SYMPTOMS
VOMITING: 0
NUMBNESS: 1
NAUSEA: 1
HEADACHES: 1

## 2019-04-10 ASSESSMENT — MIFFLIN-ST. JEOR: SCORE: 1929.78

## 2019-04-10 NOTE — ED TRIAGE NOTES
Ruth Ann was brought in by car from home after a fall yesterday in the park around 1705. Pt has long history of right ankle surgeries and tendon repairs w Dr. Laguna. VSS on room air. Pt c/o 8/10 pain in his ankle.

## 2019-04-10 NOTE — ED PROVIDER NOTES
"  History     Chief Complaint   Patient presents with     Ankle Pain     The history is provided by the patient, medical records and the spouse.     Ruth Ann French is a 63 year old male with a history of ankle pain, s/p ankle reconstructions (bone and tendon), chronic pain of right hip, prior TBI, and recurrent falls who presents to the Emergency Department for evaluation of right ankle pain.  The patient reports he was walking through the park last night (04/09/2019), when his right ankle \"turned in\". He states he was on a slope and he was not able to right his balance, which was followed by his right knee twisting in the opposite direction of the ankle.  The patient reports he then \"flipped over\" and fell. He does not believe that he struck his head; he denies loss of consciousness, however, he reports, \"the brain was scrambled\". He reports he lied still for approximately 10 minutes before attempting to move, and was only able to stand up with the assistance of environmental objects (e.g. Trees).  Today, he complains of pain in the right ankle joint, pain in the right knee joint, pain in the posterior calf, nausea, a \"profound\" headache, as well as, some paraesthesia from the right mid thigh, radiating distally through the right foot.  He denies emesis.  The patient is not anticoagulated. He reports he has taken oxycodone, which he reports is not managing his pain symptoms.    I have reviewed the Medications, Allergies, Past Medical and Surgical History, and Social History in the [facility] system.    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    No family history on file.    Social History     Tobacco Use     Smoking status: Never Smoker   Substance Use Topics     Alcohol use: Not on file     No current facility-administered medications for this encounter.      Current Outpatient Medications   Medication     Acetaminophen-Codeine (TYLENOL WITH CODEINE #3 PO)     finasteride (PROSCAR) 5 MG tablet     glucosamine-chondroitin 500-400 MG CAPS per capsule     Multiple vitamin TABS     omega 3 1000 MG CAPS     order for DME     ORDER FOR DME        Allergies   Allergen Reactions     Diclofenac Swelling     Voltaren Swelling     Wheat Bran Cough     Possibility of Sensitivity       Review of Systems   Gastrointestinal: Positive for nausea. Negative for vomiting.   Musculoskeletal:        Positive for R ankle joint pain  Positive for R knee joint pain  Positive for R posterior calf pain   Neurological: Positive for numbness and headaches.        Positive for paraesthesia   All other systems reviewed and are negative.      Physical Exam   BP: 127/67  Pulse: 56  Temp: 98.1  F (36.7  C)  Resp: 18  Height: 182.9 cm (6')  Weight: 110.7 kg (244 lb)      Physical Exam  General: patient is alert and oriented and in no acute distress   Head: atraumatic and normocephalic   EENT: moist mucus membranes without tonsillar erythema or exudates, uvula midline, pupils 2 mm and symmetric round and reactive, extraocular movements intact  Neck: supple, slight tenderness to palpation at C4 in the midline  Cardiovascular: regular rate and rhythm, extremities warm and well perfused, trace left lower extremity edema, 1+ right lower extremity, 1+ DP pulses bilaterally  Pulmonary: No respiratory distress  Abdomen: soft  Musculoskeletal: swelling about the right ankle, TTP along the right lateral knee over the fibula head, TTP along the right lateral and medial malleolus, TTP along the later right foot, no laxity on varus or valgus strain of the knee, no laxity on  anterior posterior drawer, significant discomfort with any movement of the right ankle and unable to fully assess for instability, full range of motion of the upper extremities and left lower extremity  Neurological: alert and oriented, moving all extremities symmetrically, CN II-XII intact with increased sensation to light touch at right V1, strength 5/5 and symmetric in , elbow flexion/extension, hip flexion/extension, knee flexion/extension, sensation to light touch in distal upper and lower extremities intact, normal finger to nose bilaterally  Skin: warm, dry, ecchymosis around the right ankle without laceration or skin breakdown.     ED Course   9:27 AM  The patient was seen and examined by Dr. Kate in Room [facility].        Procedures             Critical Care time:  none             Labs Ordered and Resulted from Time of ED Arrival Up to the Time of Departure from the ED - No data to display         Assessments & Plan (with Medical Decision Making)   Mr. French is a 63-year-old male with a history of multiple prior right ankle surgeries and fractures as well as a prior TBI who presents to the Emergency Department following a mechanical fall. He is neurologically intact with the exception of hypoesthesia over right V1, which is at baseline, but is complaining of a severe headache with associated nausea. Given his age, cannot clear via Citizen of [place] head CT rules. Patient to go for head CT which is negative.  He does have a history of TBI and has likely had exacerbation of symptoms from his most recent fall. 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  Demonstrates shellie films were obtained of the right lower extremity which are notable for tiny avulsion fractures of the medial malleolus, otherwise negative. He does have significant swelling  in the lower extremity but does not have instability of the right knee and has intact peripheral pulses as well as perfusion. Low suspicion for vascular injury. Discussed with orthopedic surgery and will place in cam boot, WBAT.  Have placed an orthopedic referral for him to follow-up in the outpatient clinic for reevaluation and additional imaging to evaluate for any soft tissue injury as well if indicated at that time.      This part of the medical record was transcribed by Rachel Bingham, Medical Scribe, from a dictation done by Carla Kate MD.     I have reviewed the nursing notes.    I have reviewed the findings, diagnosis, plan and need for follow up with the patient.       Medication List      There are no discharge medications for this visit.         Final diagnoses:   Closed nondisplaced fracture of medial malleolus of right tibia, initial encounter   Fall, initial encounter   I, Nicola Holland, am serving as a trained medical scribe to document services personally performed by Carla Kate MD, based on the provider's statements to me.      I, Carla Kate MD, was physically present and have reviewed and verified the accuracy of this note documented by Nicola Holland.     4/10/2019   [facility], [place], EMERGENCY DEPARTMENT     Carla Kate MD  04/10/19 1500

## 2019-04-10 NOTE — TELEPHONE ENCOUNTER
RECORDS RECEIVED FROM: Closed nondisplaced fracture of medial malleolus of right tibia, initial encounter - DOI: 04/09/2019 - ER notes and xrays in Epic - Okay per note in chart - Per pt    DATE RECEIVED: Apr 12, 2019    NOTES STATUS DETAILS   OFFICE NOTE from referring provider N/A    OFFICE NOTE from other specialist N/A    DISCHARGE SUMMARY from hospital N/A    DISCHARGE REPORT from the ER Internal 04/10/19    OPERATIVE REPORT N/A    MEDICATION LIST Internal    IMPLANT RECORD/STICKER N/A    LABS     CBC/DIFF N/A    CULTURES N/A    INJECTIONS DONE IN RADIOLOGY N/A    MRI N/A    CT SCAN N/A    XRAYS (IMAGES & REPORTS) Internal 04/10/19    TUMOR     PATHOLOGY  Slides & report N/A

## 2019-04-10 NOTE — DISCHARGE INSTRUCTIONS
Please make an appointment to follow up with Orthopedics (phone: [phone]) in 7 days.    You should receive a call from [facility] to schedule your follow up appointment. If you do not hear from them within 24 business hours, call [phone], option 3 for help in scheduling your follow up.  If you are seen in the Emergency Department over the weekend, you will receive a phone call on the next business day.     Use crutches and wear Cam boot until orthopedic follow-up.  If you have any worsening pain, numbness, weakness, discoloration of the foot or other concerns, return to the emergency department for re-evaluation.

## 2019-04-10 NOTE — TELEPHONE ENCOUNTER
Message left for patient to call back and schedule a follow up with Dr. Penaloza either this Friday 4/12/19, or next Wednesday 4/17/19.  We will accomodate him around some of the vacant spots on his schedule.

## 2019-04-10 NOTE — ED AVS SNAPSHOT
[facility], [place], Emergency Department  [address] [place] [address]  Phone:  [phone]                                    Ruth Ann French   MRN: [id number]    Department:  [facility], Emergency Department   Date of Visit:  4/10/2019           After Visit Summary Signature Page    I have received my discharge instructions, and my questions have been answered. I have discussed any challenges I see with this plan with the nurse or doctor.    ..........................................................................................................................................  Patient/Patient Representative Signature      ..........................................................................................................................................  Patient Representative Print Name and Relationship to Patient    ..................................................               ................................................  Date                                   Time    ..........................................................................................................................................  Reviewed by Signature/Title    ...................................................              ..............................................  Date                                               Time          22EPIC Rev 08/18

## 2019-04-10 NOTE — LETTER
April 10, 2019      To Whom It May Concern:      Ruth Ann French was seen in our Emergency Department today, 04/10/19.  Please excuse him from work for the next 5 days.  He may require a longer duration off work after speciality consultation but will need additional follow-up to determine.    Sincerely,        Carla Kate MD

## 2019-04-11 ASSESSMENT — ENCOUNTER SYMPTOMS
SINUS CONGESTION: 1
MUSCLE CRAMPS: 1
NECK MASS: 0
DISTURBANCES IN COORDINATION: 0
SMELL DISTURBANCE: 0
HEADACHES: 0
MUSCLE WEAKNESS: 1
SEIZURES: 0
MEMORY LOSS: 0
ORTHOPNEA: 0
ARTHRALGIAS: 1
TINGLING: 0
PALPITATIONS: 1
LEG PAIN: 0
LOSS OF CONSCIOUSNESS: 0
WEAKNESS: 0
NUMBNESS: 0
NERVOUS/ANXIOUS: 1
HOARSE VOICE: 0
NECK PAIN: 1
SINUS PAIN: 1
BACK PAIN: 0
SLEEP DISTURBANCES DUE TO BREATHING: 0
HYPOTENSION: 0
SORE THROAT: 0
PARALYSIS: 0
PANIC: 0
JOINT SWELLING: 1
LIGHT-HEADEDNESS: 0
TREMORS: 0
HYPERTENSION: 0
EXERCISE INTOLERANCE: 1
DIZZINESS: 0
SYNCOPE: 0
TASTE DISTURBANCE: 0
TROUBLE SWALLOWING: 1
SPEECH CHANGE: 0
STIFFNESS: 1
DEPRESSION: 1
DECREASED CONCENTRATION: 1
INSOMNIA: 1
MYALGIAS: 1

## 2019-04-12 ENCOUNTER — DOCUMENTATION ONLY (OUTPATIENT)
Dept: CARE COORDINATION | Facility: CLINIC | Age: 64
End: 2019-04-12

## 2019-04-12 ENCOUNTER — OFFICE VISIT (OUTPATIENT)
Dept: ORTHOPEDICS | Facility: CLINIC | Age: 64
End: 2019-04-12
Payer: COMMERCIAL

## 2019-04-12 ENCOUNTER — PRE VISIT (OUTPATIENT)
Dept: ORTHOPEDICS | Facility: CLINIC | Age: 64
End: 2019-04-12

## 2019-04-12 VITALS — HEIGHT: 72 IN | BODY MASS INDEX: 33.05 KG/M2 | WEIGHT: 244 LBS

## 2019-04-12 DIAGNOSIS — S82.51XA DISPLACED FRACTURE OF MEDIAL MALLEOLUS OF RIGHT TIBIA, INITIAL ENCOUNTER FOR CLOSED FRACTURE: Primary | ICD-10-CM

## 2019-04-12 RX ORDER — INDOMETHACIN 25 MG/1
CAPSULE ORAL
COMMUNITY
Start: 2018-08-08 | End: 2024-03-18

## 2019-04-12 ASSESSMENT — MIFFLIN-ST. JEOR: SCORE: 1929.78

## 2019-04-12 NOTE — NURSING NOTE
Reason For Visit:   Chief Complaint   Patient presents with     Right Ankle - Fracture     DOI 4/10/19. Slipped and fell on sidewide. Previous pt of Dr. Laguna, states he has had a tendon transfer in the right ankle       Ht 1.829 m (6')   Wt 110.7 kg (244 lb)   BMI 33.09 kg/m      Pain Assessment  Patient Currently in Pain: Yes  0-10 Pain Scale: 7  Primary Pain Location: Ankle  Pain Descriptors: Aching(Expanding)  Aggravating Factors: Walking, Standing    Sascha Christina, ATC

## 2019-04-12 NOTE — LETTER
Verification of Appointment  2019     Seen today: yes    Patient:  Ruth Ann French  :   1955  MRN:     [id number]  Physician: BHUMIKA BRAY    Ruth Ann French may not return to work until Date: 2019.  He recently sustained an ankle injury and has difficulty being out of bed.  I expect he will need to wear a walking boot for the next 4-6 weeks.  His ankle injury may take 3 months or more to fully recover.    Sincerely,                  Electronically signed by Bhumika Bray MD

## 2019-04-15 ENCOUNTER — TELEPHONE (OUTPATIENT)
Dept: ORTHOPEDICS | Facility: CLINIC | Age: 64
End: 2019-04-15

## 2019-04-15 NOTE — TELEPHONE ENCOUNTER
[facility] Call Center    Phone Message    May a detailed message be left on voicemail: yes    Reason for Call: Other: Pt stated his walking boot broke and does not work, he wants to know if he can come and pick one up or if he needs order for one. Please call him back either way.     Action Taken: Message routed to:  [facility] ([facility]): Ortho

## 2019-04-15 NOTE — TELEPHONE ENCOUNTER
Called patient and he told me that the heel of his 30 some year old boot had fallen off and that's why he was calling in to get a new boot. He will most likely be coming in tomorrow for it.

## 2019-06-28 ENCOUNTER — DOCUMENTATION ONLY (OUTPATIENT)
Dept: ORTHOPEDICS | Facility: CLINIC | Age: 64
End: 2019-06-28

## 2019-09-30 ENCOUNTER — HEALTH MAINTENANCE LETTER (OUTPATIENT)
Age: 64
End: 2019-09-30

## 2020-01-14 NOTE — LETTER
4/12/2019       RE: Ruth Ann French  [address]   [place] [address]     Dear Colleague,    Thank you for referring your patient, Ruth Ann French, to the [facility]. Please see a copy of my visit note below.    This 63-year-old man had a slip with a ankle twist 2 days ago.  He developed ecchymosis medially and a small amount laterally.  He has pain over the medial and lateral aspect of his ankle.  He has a complex ankle history with multiple surgeries for ligament reconstruction after injuries.  He does not drive because a history of a brain injury.  He works as an .  I reviewed his patient survey information in the EMR.    On examination he is alert oriented and in no acute distress.  The right foot and ankle are swollen with ecchymosis medially and a small amount laterally.  He has numerous well-healed incisions without any erythema or open wounds.  Eyes were not nonicteric.  Respirations were regular and unlabored.  Sensations intact in the foot and is well perfused.    I reviewed x-rays which show 2 small pieces of bone that seem to have come off of the medial malleolus right where the patient's ecchymosis is.  It is likely that he avulsed part of his deltoid ligament.  He has some lateral changes which seem old.  There is no sign of other fractures.    Seems to be mostly a soft tissue injury.  I will allow the patient to weight-bear as tolerated in the boot and have him return in 1 month.  He should keep this elevated and wrapped and minimize the amount of time upright or walking.  I have answered all his questions.    Again, thank you for allowing me to participate in the care of your patient.      Sincerely,    Joao Penaloza MD       216

## 2020-02-05 DIAGNOSIS — G89.29 CHRONIC PAIN OF RIGHT ANKLE: Primary | ICD-10-CM

## 2020-02-05 DIAGNOSIS — M25.571 CHRONIC PAIN OF RIGHT ANKLE: Primary | ICD-10-CM

## 2020-02-06 ASSESSMENT — ENCOUNTER SYMPTOMS
SYNCOPE: 0
EYE REDNESS: 0
EYE WATERING: 0
LIGHT-HEADEDNESS: 0
JOINT SWELLING: 1
SEIZURES: 0
MYALGIAS: 0
EXERCISE INTOLERANCE: 0
PARALYSIS: 0
BACK PAIN: 0
LOSS OF CONSCIOUSNESS: 0
NERVOUS/ANXIOUS: 1
NECK PAIN: 1
LEG PAIN: 0
HEADACHES: 1
DOUBLE VISION: 0
HYPERTENSION: 0
DECREASED CONCENTRATION: 1
DIZZINESS: 1
EYE IRRITATION: 0
TREMORS: 0
INSOMNIA: 1
EYE PAIN: 1
MUSCLE WEAKNESS: 1
TINGLING: 0
PANIC: 0
SPEECH CHANGE: 0
NUMBNESS: 1
DISTURBANCES IN COORDINATION: 1
PALPITATIONS: 1
MUSCLE CRAMPS: 1
SLEEP DISTURBANCES DUE TO BREATHING: 0
ARTHRALGIAS: 1
WEAKNESS: 1
MEMORY LOSS: 0
DEPRESSION: 1
HYPOTENSION: 0
STIFFNESS: 1
ORTHOPNEA: 0

## 2020-02-18 ENCOUNTER — ANCILLARY PROCEDURE (OUTPATIENT)
Dept: GENERAL RADIOLOGY | Facility: CLINIC | Age: 65
End: 2020-02-18
Attending: ORTHOPAEDIC SURGERY
Payer: OTHER MISCELLANEOUS

## 2020-02-18 ENCOUNTER — OFFICE VISIT (OUTPATIENT)
Dept: ORTHOPEDICS | Facility: CLINIC | Age: 65
End: 2020-02-18
Payer: OTHER MISCELLANEOUS

## 2020-02-18 VITALS — HEIGHT: 72 IN | WEIGHT: 239.1 LBS | BODY MASS INDEX: 32.38 KG/M2

## 2020-02-18 DIAGNOSIS — M25.571 PAIN IN JOINT, ANKLE AND FOOT, RIGHT: Primary | ICD-10-CM

## 2020-02-18 DIAGNOSIS — G89.29 CHRONIC PAIN OF RIGHT ANKLE: ICD-10-CM

## 2020-02-18 DIAGNOSIS — M25.571 CHRONIC PAIN OF RIGHT ANKLE: ICD-10-CM

## 2020-02-18 ASSESSMENT — MIFFLIN-ST. JEOR: SCORE: 1909.55

## 2020-02-18 NOTE — NURSING NOTE
"Reason For Visit:   Chief Complaint   Patient presents with     Consult     W/C  right ankle        Ht 1.84 m (6' 0.44\")   Wt 108.5 kg (239 lb 1.6 oz)   BMI 32.03 kg/m      Would like a new orthotics order    Pain Assessment  Patient Currently in Pain: Yes  0-10 Pain Scale: 5  Primary Pain Location: Ankle    Tracey Hernandez ATC    "

## 2020-02-18 NOTE — PROGRESS NOTES
CHIEF COMPLAINT:  Right foot pain.      HISTORY OF PRESENT ILLNESS:  Mr. French is a 64-year-old male who presents today for further evaluation with us.  The patient has not been evaluated since 2013.  Overall, he reports to be doing well.  Reports to have sustained a TBI approximately 4 years ago.  Reports to have sustained an ankle sprain in 04/2019 which was evaluated and assessed by Dr. Penaloza.      Overall, he reports to be doing well and to continue umm along.      He presents today for a renewal of his orthotics as he believes that he is developing some pain and discomfort.  In the past, the orthotics have been prescribed for both metatarsalgia as well as some plantar fibromatosis.      Denies to have any problems on the opposite foot.      PAST MEDICAL HISTORY:  Reviewed today.      PAST SURGICAL HISTORY:  Reviewed today.        DRUG ALLERGIES:  Diclofenac and Voltaren.      CURRENT MEDICATIONS:  Please refer to encounter form.      PHYSICAL EXAMINATION:  On today's visit, he presents as a pleasant male in no apparent distress with a height of 6 feet and a weight of 249 pounds.  Denies to have any constitutional symptoms.      On today's visit, he presents with full range of motion of the right ankle, hindfoot and midfoot joints.  CMS intact.  Skin intact.  Presents with a very mild and minimum plantar fibromatosis.  There is some callus formation along the plantar aspect of the foot, but this is not painful to the touch.  Alignment of the lesser toes is excellent.      RADIOGRAPHIC EVALUATION:  Plain x-rays of the right ankle were obtained today which were significant for showing some very slight osteoarthritis across the ankle and subtalar joints.  Otherwise, the patient presents with no significant findings.  There are no acute findings.      ASSESSMENT:     1.  Right ankle and subtalar joint arthritis.   2.  Right foot metatarsalgia.      PLAN:  I discussed with the patient that I think it is very  reasonable to proceed with custom orthotics.  A prescription for that purpose was given to the patient for a right metatarsal pad and bilateral arch supports.  The patient has no activity restrictions.  All questions were answered.  The patient will follow up on a p.r.n. basis.  I encouraged the patient to proceed with renewal of the orthotics at least every 2 years.      TT 30 minutes, CT 20 minutes.

## 2020-02-18 NOTE — LETTER
2/18/2020       RE: Ruth Ann French  [address] [place] [address]     Dear Colleague,    Thank you for referring your patient, Ruth Ann French, to the [facility] at [facility]. Please see a copy of my visit note below.    CHIEF COMPLAINT:  Right foot pain.      HISTORY OF PRESENT ILLNESS:  Mr. French is a 64-year-old male who presents today for further evaluation with us.  The patient has not been evaluated since 2013.  Overall, he reports to be doing well.  Reports to have sustained a TBI approximately 4 years ago.  Reports to have sustained an ankle sprain in 04/2019 which was evaluated and assessed by Dr. Penaloza.      Overall, he reports to be doing well and to continue umm along.      He presents today for a renewal of his orthotics as he believes that he is developing some pain and discomfort.  In the past, the orthotics have been prescribed for both metatarsalgia as well as some plantar fibromatosis.      Denies to have any problems on the opposite foot.      PAST MEDICAL HISTORY:  Reviewed today.      PAST SURGICAL HISTORY:  Reviewed today.        DRUG ALLERGIES:  Diclofenac and Voltaren.      CURRENT MEDICATIONS:  Please refer to encounter form.      PHYSICAL EXAMINATION:  On today's visit, he presents as a pleasant male in no apparent distress with a height of 6 feet and a weight of 249 pounds.  Denies to have any constitutional symptoms.      On today's visit, he presents with full range of motion of the right ankle, hindfoot and midfoot joints.  CMS intact.  Skin intact.  Presents with a very mild and minimum plantar fibromatosis.  There is some callus formation along the plantar aspect of the foot, but this is not painful to the touch.  Alignment of the lesser toes is excellent.      RADIOGRAPHIC EVALUATION:  Plain x-rays of the right ankle were obtained today which were significant for showing some very slight osteoarthritis  across the ankle and subtalar joints.  Otherwise, the patient presents with no significant findings.  There are no acute findings.      ASSESSMENT:     1.  Right ankle and subtalar joint arthritis.   2.  Right foot metatarsalgia.      PLAN:  I discussed with the patient that I think it is very reasonable to proceed with custom orthotics.  A prescription for that purpose was given to the patient for a right metatarsal pad and bilateral arch supports.  The patient has no activity restrictions.  All questions were answered.  The patient will follow up on a p.r.n. basis.  I encouraged the patient to proceed with renewal of the orthotics at least every 2 years.      TT 30 minutes, CT 20 minutes.     Again, thank you for allowing me to participate in the care of your patient.      Sincerely,    Jack Laguna MD

## 2020-03-15 ENCOUNTER — HEALTH MAINTENANCE LETTER (OUTPATIENT)
Age: 65
End: 2020-03-15

## 2021-01-15 ENCOUNTER — HEALTH MAINTENANCE LETTER (OUTPATIENT)
Age: 66
End: 2021-01-15

## 2021-03-02 ENCOUNTER — TRANSFERRED RECORDS (OUTPATIENT)
Dept: HEALTH INFORMATION MANAGEMENT | Facility: CLINIC | Age: 66
End: 2021-03-02

## 2021-03-09 ENCOUNTER — TRANSFERRED RECORDS (OUTPATIENT)
Dept: HEALTH INFORMATION MANAGEMENT | Facility: CLINIC | Age: 66
End: 2021-03-09

## 2021-03-17 ENCOUNTER — TRANSFERRED RECORDS (OUTPATIENT)
Dept: HEALTH INFORMATION MANAGEMENT | Facility: CLINIC | Age: 66
End: 2021-03-17

## 2021-04-14 ENCOUNTER — TRANSFERRED RECORDS (OUTPATIENT)
Dept: HEALTH INFORMATION MANAGEMENT | Facility: CLINIC | Age: 66
End: 2021-04-14

## 2021-05-09 ENCOUNTER — HEALTH MAINTENANCE LETTER (OUTPATIENT)
Age: 66
End: 2021-05-09

## 2021-05-25 ENCOUNTER — RECORDS - HEALTHEAST (OUTPATIENT)
Dept: ADMINISTRATIVE | Facility: CLINIC | Age: 66
End: 2021-05-25

## 2021-06-01 ENCOUNTER — TRANSFERRED RECORDS (OUTPATIENT)
Dept: HEALTH INFORMATION MANAGEMENT | Facility: CLINIC | Age: 66
End: 2021-06-01

## 2021-06-08 NOTE — PROGRESS NOTES
[facility] Daily Progress     Patient Name: Ruth Ann French  Date: 2/1/2017  Visit #: 12/12    Referral Diagnosis:  Referring provider: Perri Tijerina  Visit Diagnosis:     ICD-10-CM    1. Intractable chronic post-traumatic headache G44.321    2. Cervicalgia M54.2    3. Post concussion syndrome F07.81    4. Chronic neck pain M54.2     G89.29    5. Head pain, chronic R51     G89.29          Assessment:     Patient demonstrates understanding/independence with home program.  Patient is benefitting from skilled physical therapy and is making steady progress toward functional goals.  Patient is appropriate to continue with skilled physical therapy intervention, as indicated by initial plan of care.    Goal Status:  No Data Recorded  No Data Recorded    Plan / Patient Education:     Continue with initial plan of care.    Subjective:     Pain Rating: 3  MRI reveals enlarged lymph nodes at R hip      Objective:     Lymphatic restrictions to R hip 4 sec,   Neck and head 3 sec,   R sphenoid restiction,       Treatment Today     TREATMENT MINUTES COMMENTS   Evaluation     Self-care/ Home management     Manual therapy 60 MFR with OA.   Cranial release to sphenoid bone.   TRIG-1,2,3-N,bilat,   TEMP-N bilat,   FAC1,2,3 bilat,   GO-N bilat,   CDT-N R,   ANS-N R,  MYLO-N bilatRLAR-N bilat,   PGC1,2R,       Releases achieved   Face and neck relaxing.   Pain levels feel different,   R eye pain resolved       Cont with post gang  Of the C spine   Neuromuscular Re-education     Therapeutic Activity     Therapeutic Exercises     Gait training     Modality__________________                Total 60    Blank areas are intentional and mean the treatment did not include these items.       Suresh Espinoza  2/1/2017

## 2021-06-08 NOTE — PROGRESS NOTES
[facility] Daily Progress     Patient Name: Ruth Ann French  Date: 1/3/2017  Visit #: 8/12 + 12    Referral Diagnosis: Neck and Head ache pain  Referring provider: Raffi Eng MD  Visit Diagnosis:     ICD-10-CM    1. Intractable chronic post-traumatic headache G44.321    2. Cervicalgia M54.2          Assessment:     Patient demonstrates understanding/independence with home program.  Patient is benefitting from skilled physical therapy and is making steady progress toward functional goals.  Patient is appropriate to continue with skilled physical therapy intervention, as indicated by initial plan of care.    Goal Status:  No Data Recorded  No Data Recorded    Plan / Patient Education:     Continue with initial plan of care.    Subjective:     Pain Rating: 3  Saw Dr Dominique  And she wants me to work on the balance issue      Objective:       3 sec    Treatment Today   TREATMENT MINUTES COMMENTS   Evaluation     Self-care/ Home management     Manual therapy 60 MFR with OA, L5-SI and SI joint releases, Dural Tube Pull,  Cranial releases to Temporal,  Sphenoid bones.   BRAY-N bilatTENT-N bilat,   SB-N Anterior Parasympathetic Pre ganglionics T 1,2,3, L 4,  R 5   Sinu-vertebral nerves 3,5,6 right  Frontal sinus bilat    HA resolved,  Neck pain decreased 3/10 to 1/10.   MF tension decreased to the neck and skull   Neuromuscular Re-education     Therapeutic Activity     Therapeutic Exercises     Gait training     Modality__________________                Total 60    Blank areas are intentional and mean the treatment did not include these items.       .pteval    Suresh Espinoza  1/3/2017

## 2021-06-08 NOTE — PROGRESS NOTES
[facility] Daily Progress     Patient Name: Ruth Ann French  Date: 2017  Visit #:  12  2 HP    Referral Diagnosis: Post concussive HA's,  Neck pain   Referring provider: Raffi Eng MD  Visit Diagnosis:     ICD-10-CM    1. Intractable chronic post-traumatic headache G44.321    2. Cervicalgia M54.2    3. Post concussion syndrome F07.81          Assessment:     Patient demonstrates understanding/independence with home program.  Patient is benefitting from skilled physical therapy and is making steady progress toward functional goals.  Patient is appropriate to continue with skilled physical therapy intervention, as indicated by initial plan of care.    Goal Status:  No Data Recorded  No Data Recorded    Plan / Patient Education:     Continue with initial plan of care.    Subjective:     Pain Ratin  SWITCHING OF EYE GLASSES CAUSES ELEVATION OF  HA      Objective:     Temporal bone restrictiion,       Treatment Today     TREATMENT MINUTES COMMENTS   Evaluation     Self-care/ Home management     Manual therapy 60 MFR with OA, L5-SI and SI joint releases, Dural Tube Pull,  Temporal bone release,  SCS to Frontal, Nasal sinuses.   LFALX-N,  MFALX-N,   TENT-N,   BRAY-N,   SB-N,   DC7,6,5-N R    HA reduced 4/10 to 1-2/10.   Temporal bone restriction resolved.   Cranial tender points reduced   Neuromuscular Re-education     Therapeutic Activity     Therapeutic Exercises     Gait training     Modality__________________                Total 60    Blank areas are intentional and mean the treatment did not include these items.       Suresh Espinoza  2017

## 2021-06-08 NOTE — PROGRESS NOTES
[facility] Daily Progress     Patient Name: Perri Gunter  Date: 1/24/2017  Visit #:11/12    Referral Diagnosis: Post concussive head ache, neck pain  Referring provider: Perri Dominique  Visit Diagnosis: HA and LBP    ICD-10-CM    1. Intractable chronic post-traumatic headache G44.321    2. Cervicalgia M54.2    3. Post concussion syndrome F07.81          Assessment:     Patient demonstrates understanding/independence with home program.  Patient is benefitting from skilled physical therapy and is making steady progress toward functional goals.  Patient is appropriate to continue with skilled physical therapy intervention, as indicated by initial plan of care.    Goal Status:    Pt amado be Ind. In the self management of his condition in 6 weeks  Pt will be Independent in performance of home exercise program and self releases as taught to him   6 weeks  Pt will re duce the frequency and intensity of his HA<s to 0-1/10 occurring 1 x / week  6 weeks  Pt will resolve his HA's in 12 weeks          Plan / Patient Education:     Continue with initial plan of care.    Subjective:     Pain Rating: 3  Some LBP secondary to exercise and mattress      Objective:     R SI downslip,with slight posterior rotation of the R Illium,   Frontal HA 3/10,  Supra orbital ridge more prominent due to edema,   Tender points to R pelvis and crainum    Treatment Today     TREATMENT MINUTES COMMENTS   Evaluation     Self-care/ Home management     Manual therapy 60 MFR with OA, L5-SI and SI joint releases, Dural Tube Pull,   Strain Counterstrain  to Trochanteric bursa R,   DCIL-A,  Illeo- Cecal ligament R,   Trigeminal nerve Opthalmic, Maxillay and Mandibular branches  R side.   Frontal bone release.    Bone bruise release to Supra orbital ridge of frontal bone.    Releases achieved.   R SI joint dysfuntion resolved  #/10 Head ache resolved 0/10.   Vision clearer.  Light sensitivity much improved.  R  sided back pain resolved   Neuromuscular Re-education     Therapeutic Activity     Therapeutic Exercises     Gait training     Modality__________________                Total 60    Blank areas are intentional and mean the treatment did not include these items.       Suresh Espinoza  1/24/2017

## 2021-06-08 NOTE — PROGRESS NOTES
[facility] Daily Progress     Patient Name: Ruth Ann French  Date: 2/15/2017  Visit #: 2/12 + 36    Referral Diagnosis: Cervicalgia, Chronic HA and Head Pain  Referring provider: Raffi Eng MD  Visit Diagnosis:     ICD-10-CM    1. Intractable chronic post-traumatic headache G44.321    2. Cervicalgia M54.2    3. Post concussion syndrome F07.81    4. Chronic neck pain M54.2     G89.29    5. Head pain, chronic R51     G89.29          Assessment:     Patient demonstrates understanding/independence with home program.  Patient is benefitting from skilled physical therapy and is making steady progress toward functional goals.  Patient is appropriate to continue with skilled physical therapy intervention, as indicated by initial plan of care.    Goal Status:  No Data Recorded  No Data Recorded    Plan / Patient Education:     Continue with initial plan of care.    Subjective:     Pain Rating: 3  Some hip Paion dfrom my bed.  I Am rplacing the Mattress      Objective:      4 sec,  Frontal head pain,       Treatment Today     TREATMENT MINUTES COMMENTS   Evaluation     Self-care/ Home management     Manual therapy 60 OM-N L   ABD-N,   OPT-N L,   TRO-N Cranial release to Sphednoid,  Dural tube pull,   SCS to L Psoas,  L Hip Trochanter,  Saph-LV L,   SSAPH-LV L,   Femoral Vein L,   Sigmoid Med, Lat.     Vision less stressful   L  LBP reduced Pt able to see with less eye strain.   See pt in 2 weeks due to his busy health and work schedule    Neuromuscular Re-education     Therapeutic Activity     Therapeutic Exercises     Gait training     Modality__________________                Total 60    Blank areas are intentional and mean the treatment did not include these items.       Suresh Espinoza  2/15/2017

## 2021-06-08 NOTE — PROGRESS NOTES
[facility] Daily Progress     Patient Name: Ruth Ann Frnech  Date: 2017  Visit #:     Referral Diagnosis: Chronic neck and HA pain  Referring provider: Raffi Eng MD  Visit Diagnosis:     ICD-10-CM    1. Intractable chronic post-traumatic headache G44.321    2. Cervicalgia M54.2    3. Post concussion syndrome F07.81    4. Chronic neck pain M54.2     G89.29    5. Head pain, chronic R51     G89.29          Assessment:     Patient demonstrates understanding/independence with home program.  Patient is benefitting from skilled physical therapy and is making steady progress toward functional goals.  Patient is appropriate to continue with skilled physical therapy intervention, as indicated by initial plan of care.    Goal Status:  No Data Recorded  No Data Recorded    Plan / Patient Education:     Continue with initial plan of care.    Subjective:     Pain Ratin  Can't wear bifocal  Eyes begin to Wobble   Tender points to cranium       Objective:        WNL's,   Restricted lymphatic flow to the face and neck 3 sec,   Head 4 sec    Treatment Today     TREATMENT MINUTES COMMENTS   Evaluation     Self-care/ Home management     Manual therapy 60 MFR with OA release   Dural tube pull,    SCS to 1,2,3,4,6,8,9, cranial nerves   TRIG1-N   TEMP-N  R,   Sphenoid &  lateral release,   Facial Nerve Temporal branch R      Tender points resolved.   Facial and cranial tensions reduced.   R temple and eye orbit pain reduced.  Overall facial and craniaL pain reduced   Neuromuscular Re-education     Therapeutic Activity     Therapeutic Exercises     Gait training     Modality__________________                Total 60    Blank areas are intentional and mean the treatment did not include these items.       Suresh Espinoza  2017

## 2021-06-08 NOTE — PROGRESS NOTES
[facility] Daily Progress     Patient Name: Ruth Ann French  Date: 2017  Visit #: 10/12 + 12    Referral Diagnosis: HA, Neck Pain  Referring provider: Raffi Eng MD  Visit Diagnosis:     ICD-10-CM    1. Intractable chronic post-traumatic headache G44.321    2. Cervicalgia M54.2    3. Post concussion syndrome F07.81    4. Head pain, chronic R51     G89.29    5. Chronic neck pain M54.2     G89.29          Assessment:     Patient demonstrates understanding/independence with home program.  Patient is benefitting from skilled physical therapy and is making steady progress toward functional goals.  Patient is appropriate to continue with skilled physical therapy intervention, as indicated by initial plan of care.    Goal Status:  No Data Recorded  No Data Recorded    Plan / Patient Education:     Continue with initial plan of care.    Subjective:     Pain Ratin  HA last week after my new glasses wwere given to me were worse       Objective:     R SI downslip,   Tender ppoints to the Ant, post pelvis,  Sinuses C Spine    Treatment Today     TREATMENT MINUTES COMMENTS   Evaluation     Self-care/ Home management     Manual therapy 60 MFR with OA, L5-SI and SI joint releases, Dural Tube Pull,  SCS to cecum , sigmpoid med, lat inf points.  SCS to MDCF-LV L,   SCXS to Frontal ,Maxillary,  Sinuses.   Cranial releases Temporal, Sphenoid bones    SI dysf and HA resolved.   Tender points reduced.   Sinuses opened.   Pain levels 0-1/10   Neuromuscular Re-education     Therapeutic Activity     Therapeutic Exercises     Gait training     Modality__________________                Total 60    Blank areas are intentional and mean the treatment did not include these items.       Suresh Espinoza  2017

## 2021-06-09 NOTE — PROGRESS NOTES
[facility] Daily Progress     Patient Name: Ruth Ann French  Date: 3/6/2017  Visit #:3/12 + 36    Referral Diagnosis: Chronic Neck, HA  Referring provider: Raffi Eng MD  Visit Diagnosis:     ICD-10-CM    1. Cervicalgia M54.2    2. Post concussion syndrome F07.81    3. Chronic neck pain M54.2     G89.29    4. Head pain, chronic R51     G89.29    5. Intractable chronic post-traumatic headache G44.321          Assessment:     Patient demonstrates understanding/independence with home program.  Patient is benefitting from skilled physical therapy and is making steady progress toward functional goals.  Patient is appropriate to continue with skilled physical therapy intervention, as indicated by initial plan of care.    Goal Status:  No Data Recorded  No Data Recorded    Plan / Patient Education:     Continue with initial plan of care.    Subjective:     Pain Rating: 3-4/10        Objective:     HA is persistent    Treatment Today     TREATMENT MINUTES COMMENTS   Evaluation     Self-care/ Home management     Manual therapy 60 MFR with OA, L5-SI and SI joint releases, Dural Tube Pull   Cranial releases to Sphenoid, Temporal,  Frontal, Nasal bones.   SCS to Frontal, Nasal sinuses   Trig1, 2,3 - N        HA and facial pain reduced following treatment.  Nasal pain resolved.  Pt instructed in Trigeminal nerve release for the Opthalmic component.   Pt Ind with release   Neuromuscular Re-education     Therapeutic Activity     Therapeutic Exercises     Gait training     Modality__________________                Total 60    Blank areas are intentional and mean the treatment did not include these items.       Suresh Espinoza  3/6/2017

## 2021-06-09 NOTE — PROGRESS NOTES
[facility] Daily Progress     Patient Name: Ruth Ann French  Date: 4/3/2017  Visit #:5/12 + 36    Referral Diagnosis: Neck and Head pain  Referring provider: Raffi Eng MD  Visit Diagnosis:     ICD-10-CM    1. Cervicalgia M54.2    2. Post concussion syndrome F07.81    3. Chronic neck pain M54.2     G89.29    4. Head pain, chronic R51     G89.29    5. Intractable chronic post-traumatic headache G44.321          Assessment:     Patient demonstrates understanding/independence with home program.  Patient is benefitting from skilled physical therapy and is making steady progress toward functional goals.  Patient is appropriate to continue with skilled physical therapy intervention, as indicated by initial plan of care.    Goal Status:  No Data Recorded  No Data Recorded    Plan / Patient Education:     Continue with initial plan of care.    Subjective:     Pain Rating: 3  3 days in the field  Rough terrain  HA is worse,         Objective:     R SI downslip,   Sphenoid restriction,   Dural restriction at C3, T 12    Treatment Today     TREATMENT MINUTES COMMENTS   Evaluation     Self-care/ Home management     Manual therapy 60 MFR with OA, L5-SI and SI joint releases, Dural Tube Pull,  Sphenoid release,   SCS to R FRANCINE,  ANT L-5,  SCS to Trigeminal nerve Opthalmic Div.,   Mandibular div R,  Facial Nerve 1 bilat    HA reduced,  SI dysf resolved  Head Pain reduced to 1-2/10,   Eyes feel brighter,  HA located higher,   Neck more relaxed   Neuromuscular Re-education     Therapeutic Activity     Therapeutic Exercises     Gait training     Modality__________________                Total 60    Blank areas are intentional and mean the treatment did not include these items.       Suresh Espinoza  4/3/2017

## 2021-06-09 NOTE — PROGRESS NOTES
[facility] Daily Progress     Patient Name: Ruth Ann French  Date: 3/22/2017  Visit #: 4/12 + 36    Referral Diagnosis: Neck, HA and Head pain  Referring provider: Socrates Aquino MD  Visit Diagnosis:     ICD-10-CM    1. Cervicalgia M54.2    2. Post concussion syndrome F07.81    3. Chronic neck pain M54.2     G89.29    4. Head pain, chronic R51     G89.29    5. Intractable chronic post-traumatic headache G44.321          Assessment:     Patient demonstrates understanding/independence with home program.  Patient is benefitting from skilled physical therapy and is making steady progress toward functional goals.  Patient is appropriate to continue with skilled physical therapy intervention, as indicated by initial plan of care.    Goal Status:  No Data Recorded  No Data Recorded    Plan / Patient Education:     Continue with initial plan of care.    Subjective:     Pain Rating: 3  Frontal Head pain      Objective:      4 sec,  Dural restrictions at C3,7,T3   Cranial restriction at L Zygoma    Treatment Today     TREATMENT MINUTES COMMENTS   Evaluation     Self-care/ Home management     Manual therapy 60 MFR with OA, L5-SI and SI joint releases, Dural Tube Pull,  Cranial releases to Temporal, Frontal, Zygomas Nasal,  Parietal bones   B jugular foramen,   Spheno- Basilar flex and extension release,   SCS to the ATENT-N R,   Pt instructed to perform  The Temporal and Zygoma  Releases daily    Pt has a new tempropedic bed and is adjusting to it which maybe contributing to the pts difficulties.   Releases achieved with tender points reduced.      Neuromuscular Re-education     Therapeutic Activity     Therapeutic Exercises     Gait training     Modality__________________                Total 60    Blank areas are intentional and mean the treatment did not include these items.       Suresh Espinoza  3/22/2017

## 2021-06-10 NOTE — PROGRESS NOTES
[facility] Daily Progress     Patient Name: Ruth Ann French  Date: 2017  Visit #:    Referral Diagnosis: Neck Pain,  HA,  Referring provider: Raffi Eng MD  Visit Diagnosis:     ICD-10-CM    1. Cervicalgia M54.2    2. Post concussion syndrome F07.81    3. Chronic neck pain M54.2     G89.29    4. Head pain, chronic R51     G89.29    5. Intractable chronic post-traumatic headache G44.321          Assessment:     Patient demonstrates understanding/independence with home program.  Patient is benefitting from skilled physical therapy and is making steady progress toward functional goals.  Patient is appropriate to continue with skilled physical therapy intervention, as indicated by initial plan of care.    Goal Status:  No Data Recorded  No Data Recorded    Plan / Patient Education:     Continue with initial plan of care.    Subjective:     Pain Ratin-3/10  Did a lot of exercise on Sat but much Hip pain from walking on     Long trip tomorrow        Objective:     R SI downslip,       Treatment Today     TREATMENT MINUTES COMMENTS   Evaluation     Self-care/ Home management     Manual therapy 60 MFR with OA, L5-SI and SI joint releases, Dural Tube Pull,   SCS to the Frontal, Nasal sinuses,   SCS to Supra and Infraorbital points.   Frontal bone release.   SCS to the Optic-N, TRO-N,   TRIG1-N,  Instruction to pt on maxilla, frontal bone releases     Releases achieved.   HA resolved,   SI dysf resolved,   Pain levels to Renard Back and GALINDO decreased    Neuromuscular Re-education     Therapeutic Activity     Therapeutic Exercises     Gait training     Modality__________________                Total 60    Blank areas are intentional and mean the treatment did not include these items.       Suresh Espinoza  2017

## 2021-06-11 NOTE — PROGRESS NOTES
[facility] Daily Progress     Patient Name: Ruth Ann French  Date: 2017  Visit #:     Referral Diagnosis: Neck and HA pain  Referring provider: Raffi Eng MD  Visit Diagnosis:     ICD-10-CM    1. Cervicalgia M54.2    2. Post concussion syndrome F07.81    3. Chronic neck pain M54.2     G89.29    4. Head pain, chronic R51     G89.29    5. Acute post-traumatic headache, not intractable G44.319          Assessment:     Patient demonstrates understanding/independence with home program.  Patient is benefitting from skilled physical therapy and is making steady progress toward functional goals.  Patient is appropriate to continue with skilled physical therapy intervention, as indicated by initial plan of care.    Goal Status:  No Data Recorded  No Data Recorded    Plan / Patient Education:     Continue with initial plan of care.    Subjective:     Pain Ratin  L frontal pain      Objective:     CSER at 4 sec,   HA's mostly frontal,  R SI upslip    Treatment Today     TREATMENT MINUTES COMMENTS   Evaluation     Self-care/ Home management     Manual therapy 60 MFR with OA, L5-SI and SI joint releases, Dural Tube Pull   Cranial releases to Frontal, Nasal, Temporal bones.   SC S to  Frontal and nasal sinuses.   TENT-N B TRIG-1, 2,3 -N l,  FAC1-N   FAC2,3-N    Releases achieved.  Pain reduced to the Head, Face 5/10 to 2/10  Pt not squintting to see better   Neuromuscular Re-education     Therapeutic Activity     Therapeutic Exercises     Gait training     Modality__________________                Total 60    Blank areas are intentional and mean the treatment did not include these items.       Suresh Espinoza  2017

## 2021-06-11 NOTE — PROGRESS NOTES
[facility] Daily Progress     Patient Name: Ruth Ann French  Date: 2017  Visit #:    Referral Diagnosis: HA's Neck pain  Referring provider: Socrates Aquino MD  Visit Diagnosis:     ICD-10-CM    1. Cervicalgia M54.2    2. Post concussion syndrome F07.81    3. Chronic neck pain M54.2     G89.29    4. Acute post-traumatic headache, not intractable G44.319    5. Head pain, chronic R51     G89.29          Assessment:     Patient demonstrates understanding/independence with home program.  Patient is benefitting from skilled physical therapy and is making steady progress toward functional goals.  Patient is appropriate to continue with skilled physical therapy intervention, as indicated by initial plan of care.    Goal Status:  No Data Recorded  No Data Recorded    Plan / Patient Education:     Continue with initial plan of care.    Subjective:     Pain Ratin  HA pain.   CAT scan reveals an enlarged lymph gland at the hip    + Obers test      Objective:     HA   R Hip pain even at night   Lymphatic restrictions to the R hip 3 sec  R [place] channel 5 sec    Treatment Today     TREATMENT MINUTES COMMENTS   Evaluation     Self-care/ Home management     Manual therapy 60 MFR with OA, L5-SI and SI joint releases, Dural Tube Pull.    MFR with L AIC release.   Sphenoid release,  SCS to Abdominal and R LE lymphatics. SCS to Frontal and Nasal sinuses,  Frontal and Nasal bone releases.    Review of Swiss Ball Postural ex  Part 2 ex1-4    Releases achieved.   SI dysf resolved  HA is nearly resolved.  Discussed with pt his upcoming Neurology visit and the need if necessary for a MRI of the cranium   Neuromuscular Re-education     Therapeutic Activity     Therapeutic Exercises     Gait training     Modality__________________                Total 60    Blank areas are intentional and mean the treatment did not include these items.       Suresh Espinoza  2017

## 2021-06-11 NOTE — PROGRESS NOTES
[facility] Daily Progress     Patient Name: Ruth Ann French  Date: 2017  Visit #: 36    Referral Diagnosis: HA, Neck Pain  Referring provider: Raffi Eng MD  Visit Diagnosis:     ICD-10-CM    1. Cervicalgia M54.2    2. Post concussion syndrome F07.81    3. Chronic neck pain M54.2     G89.29    4. Acute post-traumatic headache, not intractable G44.319          Assessment:     Patient demonstrates understanding/independence with home program.  Patient is benefitting from skilled physical therapy and is making steady progress toward functional goals.  Patient is appropriate to continue with skilled physical therapy intervention, as indicated by initial plan of care.    Goal Status:  No Data Recorded  No Data Recorded    Plan / Patient Education:     Continue with initial plan of care.    Subjective:     Pain Ratin  Head Pain Frontal      Objective:     R SI downslip   + Scan  Visceral Post Pleura L,   Neuro  Ant Pelvis,  Arterial  Post Cranial,  Ant Cranial R,   Lig Thoracic spine    Treatment Today  TREATMENT MINUTES COMMENTS   Evaluation     Self-care/ Home management     Manual therapy 60 MFR with OA, L5-SI and SI joint releases, Dural Tube Pull.   SCS Ant Spional Art B,   SCS to SIB-V R,   PDP-V R,   CPP4-V R,  MED-V R,   BRS-V L,   ESO-V,   TL-V R,   B,   FALC-LV R,   MAX-A,   DTEM-A L,   Opthalmic Arterys,   FAC-A,   STEM-A L,   CIRC-A R,   EC-A L,   Frontal and nasal sinuses    Facial and Head  Pain reduced 0-1/10   HA reduced to 2/10 and reduced in area of coverage   Neuromuscular Re-education     Therapeutic Activity     Therapeutic Exercises     Gait training     Modality__________________                Total 60    Blank areas are intentional and mean the treatment did not include these items.       Suresh Espinoza  2017

## 2021-06-12 NOTE — PROGRESS NOTES
[facility] Daily Progress     Patient Name: Ruth Ann French  Date: 8/10/2017  Visit #:     Referral Diagnosis: Neck Pain,  HA's  Referring provider: Socrates Aquino MD  Visit Diagnosis:     ICD-10-CM    1. Cervicalgia M54.2    2. Chronic tension-type headache, intractable G44.221    3. Post concussion syndrome F07.81    4. Chronic neck pain M54.2     G89.29    5. Head pain, chronic R51     G89.29    6. Intractable chronic post-traumatic headache G44.321          Assessment:     Patient demonstrates understanding/independence with home program.  Patient is benefitting from skilled physical therapy and is making steady progress toward functional goals.  Patient is appropriate to continue with skilled physical therapy intervention, as indicated by initial plan of care.    Goal Status:  No Data Recorded  No Data Recorded    Plan / Patient Education:     Continue with initial plan of care.    Subjective:     Pain Ratin-2/10  Had a good week  Pain is lower      Objective:     R SI Downslip,    4 sec   Temporal bone restriction    Treatment Today     TREATMENT MINUTES COMMENTS   Evaluation     Self-care/ Home management     Manual therapy 60 MFR with OA, L5-SI and SI joint releases, Dural Tube Pull.  Temporal bone release  SCS to trochanteric Bursa R,   Frontal nasal release SCS  To Frontal and nasal sinusesPterygoid Plexus L,   MAX-LV,  FAC-LV,   SUBM-LV,   MAS-LV L,   Digastric Muscle L,   PAR-LV L,      HA and head Pain resolved  Sinuses opened and breathing is easier.  SI dysf resolved   Lymphatic circulation to the head and neck normalikzed   Neuromuscular Re-education     Therapeutic Activity     Therapeutic Exercises     Gait training     Modality__________________                Total 60    Blank areas are intentional and mean the treatment did not include these items.       Suresh Espinoza  8/10/2017

## 2021-06-12 NOTE — PROGRESS NOTES
[facility] Daily Progress     Patient Name: Ruth Ann French  Date: 7/26/2017  Visit #:10/12 + 36    Referral Diagnosis: Chronic neck and HA pain  Referring provider: Socrates Aquino MD  Visit Diagnosis:     ICD-10-CM    1. Cervicalgia M54.2    2. Post concussion syndrome F07.81    3. Chronic neck pain M54.2     G89.29    4. Head pain, chronic R51     G89.29          Assessment:     Patient demonstrates understanding/independence with home program.  Patient is benefitting from skilled physical therapy and is making steady progress toward functional goals.  Patient is appropriate to continue with skilled physical therapy intervention, as indicated by initial plan of care.    Goal Status:  No Data Recorded  No Data Recorded    Plan / Patient Education:     Continue with initial plan of care.    Subjective:     Pain Rating: 3/10 to Neck HA and swollen face  R Hip pain 4/10      Objective:     Tender points to the R hip , neck and cranium    Treatment Today     TREATMENT MINUTES COMMENTS   Evaluation     Self-care/ Home management     Manual therapy 60 MFR with OA, L5-SI and SI joint releases, Dural Tube Pull   SCS to Trochanteric bursa R,   [id number] R,   SCS to R Psoas,  [place],    Cranial release Temporal bone,   SCS to Frontal sinuses,   SCS to AREN-N R.    SCS to TRIG-1 Opthalmic-N L,   Trig 2 L-N  Maxillary,   TRIG3-N L  Mandibular TEMP-N R,   Cranial release to Frontal bone    Pain to the head, neck , forehead reduced.  Pain reduced to 1-2/10 to head.   Hip Pain relaxed looser    Neuromuscular Re-education     Therapeutic Activity     Therapeutic Exercises     Gait training     Modality__________________                Total 60    Blank areas are intentional and mean the treatment did not include these items.       Suresh Espinoza  7/26/2017

## 2021-06-12 NOTE — PROGRESS NOTES
[facility] Daily Progress     Patient Name: Ruth Ann French  Date: 2017  Visit #:  + 47     17 Visits for 2017     Referral Diagnosis: [unfilled]  Referring provider: Perri Dominique MD  Visit Diagnosis:     ICD-10-CM    1. Cervicalgia M54.2    2. Post concussion syndrome F07.81    3. Chronic neck pain M54.2     G89.29    4. Head pain, chronic R51     G89.29    5. Intractable chronic post-traumatic headache G44.321          Assessment:     Patient demonstrates understanding/independence with home program.  Patient is benefitting from skilled physical therapy and is making steady progress toward functional goals.  Patient is appropriate to continue with skilled physical therapy intervention, as indicated by initial plan of care.    Goal Status:  No Data Recorded  No Data Recorded    Plan / Patient Education:     Continue with initial plan of care.    Subjective:     Pain Ratin/10 point pain to the frontal bone  Occasional face pain after a night of sleep.   Eye fatigue after 5-6 hrs of work      Objective:     Frontal bone restriction    4 sec,   Cranial and facial tender points    Treatment Today    TREATMENT MINUTES COMMENTS   Evaluation     Self-care/ Home management     Manual therapy 60 MFR with OA, L5-SI and SI joint releases, Dural Tube Pull.  Cranial releases Frontal,  Sphenoid,  Temporal, Nasal bones  SCS to Frontal and Nasal sinuses,   L FALX-N,    MFALX-N,  TENT-N R,   BRAY-N,       Tender points resolved.   Face and head feel more mobile. Pain to the face and head reduced to 1/10  Pt feeling refreshed  Cranial bones  Less tension present   Neuromuscular Re-education     Therapeutic Activity     Therapeutic Exercises     Gait training     Modality__________________                Total 60    Blank areas are intentional and mean the treatment did not include these items.       Suresh Espinoza  2017

## 2021-06-13 NOTE — PROGRESS NOTES
[facility] Daily Progress     Patient Name: Ruth Ann French  Date: 10/16/2017  Visit #:  +     Referral Diagnosis: Neck and HA  pain  Referring provider: Perri Dominique MD  Visit Diagnosis:     ICD-10-CM    1. Cervicalgia M54.2    2. Post concussion syndrome F07.81    3. Chronic neck pain M54.2     G89.29    4. Head pain, chronic R51     G89.29    5. Chronic tension-type headache, intractable G44.221          Assessment:     Patient demonstrates understanding/independence with home program.  Patient is benefitting from skilled physical therapy and is making steady progress toward functional goals.  Patient is appropriate to continue with skilled physical therapy intervention, as indicated by initial plan of care.    Goal Status:  No Data Recorded  No Data Recorded    Plan / Patient Education:     Continue with initial plan of care.    Subjective:     Pain Ratin  Had a long session with the computer this AM.  Vision blurred,  GALINDO present  Lungs restricted    Objective:     + Scan  Visceral  Mid thoracic,   Neuro  Brachial Plexus,   Post Symp above T7 L.  Arterial  Post cervical  Ant Cranial.       Treatment Today     TREATMENT MINUTES COMMENTS   Evaluation     Self-care/ Home management     Manual therapy 60 SCS to bilat  Brachial Plexus ant, Post.   PGC1, 2, 3-N    SCS SIB-V   PDP-V,   CPP4-CV,   MED-V,   BRS-VL,   PDA-VR,   MP-VL,   CP3-VL,   LP-VL,   SCS to PGC1,2 -N,    MAX-AL,   DTEMP-A,   OPTH-AL,   FAC-AR,   STEM-A,   CIR-A    Tender points resolved.   HA resolved,   Vision is clearer Lymphatic flow restored to the neck and head.   Neck pain reduced to 1/10       Neuromuscular Re-education     Therapeutic Activity     Therapeutic Exercises     Gait training     Modality__________________                Total 60    Blank areas are intentional and mean the treatment did not include these items.       Suresh Espinoza  10/16/2017

## 2021-06-13 NOTE — PROGRESS NOTES
[facility] Daily Progress     Patient Name: Ruth Ann French  Date: 10/4/2017  Visit #: 3/12 + 47    Referral Diagnosis: Neck, Head  HA pain  Referring provider: Perri Dominique MD  Visit Diagnosis:     ICD-10-CM    1. Cervicalgia M54.2    2. Post concussion syndrome F07.81    3. Chronic neck pain M54.2     G89.29    4. Head pain, chronic R51     G89.29    5. Intractable chronic post-traumatic headache G44.321          Assessment:     Patient demonstrates understanding/independence with home program.  Patient is benefitting from skilled physical therapy and is making steady progress toward functional goals.  Patient is appropriate to continue with skilled physical therapy intervention, as indicated by initial plan of care.    Goal Status:  No Data Recorded  No Data Recorded    Plan / Patient Education:     Continue with initial plan of care.    Subjective:     Pain Ratin-3/10  Squinting a lot   They are keeping me in the HA program.  Waking with facial pain,  HA's  CPAP machine may be a problem      Objective:     SI's level    4 sec    Treatment Today   TREATMENT MINUTES COMMENTS   Evaluation     Self-care/ Home management     Manual therapy 60 MFR with OA, L5-SI and SI joint releases, Dural Tube Pull,   Sphenoid release,   SCS to PGC1,2,3,4,5-N   SCS to VLUN-N,   TRIG1,2L,3-N    TEMP-N L,   FAC1-N R,   FAC2,3-N L,       Releases achieved  HA and Facial pain reduced.   2-3/10 ro 1-2/10   HA nearly gone   Neuromuscular Re-education     Therapeutic Activity     Therapeutic Exercises     Gait training     Modality__________________                Total 60    Blank areas are intentional and mean the treatment did not include these items.       Suresh Espinoza  10/4/2017

## 2021-06-13 NOTE — PROGRESS NOTES
[facility] Daily Progress     Patient Name: Ruth Ann French  Date: 2017  Visit #:  47    Referral Diagnosis: Neck and Head Pain  Referring provider: Perri Dominique MD  Visit Diagnosis:     ICD-10-CM    1. Cervicalgia M54.2    2. Post concussion syndrome F07.81    3. Chronic neck pain M54.2     G89.29    4. Chronic tension-type headache, intractable G44.221    5. Head pain, chronic R51     G89.29          Assessment:     Patient demonstrates understanding/independence with home program.  Patient is benefitting from skilled physical therapy and is making steady progress toward functional goals.  Patient is appropriate to continue with skilled physical therapy intervention, as indicated by initial plan of care.    Goal Status:  No Data Recorded  No Data Recorded    Plan / Patient Education:     Continue with initial plan of care.    Subjective:     Pain Ratin-3/10  Squinting a lot   Looking down      Objective:     R SI uoslip    Treatment Today     TREATMENT MINUTES COMMENTS   Evaluation     Self-care/ Home management     Manual therapy 60 MFR with OA, L5-SI and SI joint releases, Dural Tube Pull.   SCS to PGC1,   TRIG1,2,3-N   Frontal, Nasal bone releases.   TEMP-N,   FAC-1,2,3-N    Bone brusie release to the L eye orbit   SCS to LFALX-N   LMFALX-N,   TENT-N,   BRAY-N,       Pain to Head and HA decreased to 1/10.   Decreased stress to the face   Squinting is gone    Neuromuscular Re-education     Therapeutic Activity     Therapeutic Exercises     Gait training     Modality__________________                Total 60    Blank areas are intentional and mean the treatment did not include these items.       Suresh Espinoza  2017

## 2021-06-13 NOTE — PROGRESS NOTES
[facility] Daily Progress     Patient Name: Ruth Ann French  Date: 2017  Visit #:    Referral Diagnosis: Neck pain  Head Pain,  HA  Referring provider: Socrates Aquino MD  Visit Diagnosis:     ICD-10-CM    1. Cervicalgia M54.2    2. Post concussion syndrome F07.81    3. Chronic neck pain M54.2     G89.29    4. Head pain, chronic R51     G89.29    5. Intractable chronic post-traumatic headache G44.321          Assessment:     Patient demonstrates understanding/independence with home program.  Patient is benefitting from skilled physical therapy and is making steady progress toward functional goals.  Patient is appropriate to continue with skilled physical therapy intervention, as indicated by initial plan of care.    Goal Status:  No Data Recorded  No Data Recorded    Plan / Patient Education:     Continue with initial plan of care.    Subjective:     Pain Ratin-3/10  Tending to look down when I walk.  Light sensative      Objective:     Cranial restrictions  Along with dural membrane restrictions    Treatment Today     TREATMENT MINUTES COMMENTS   Evaluation     Self-care/ Home management     Manual therapy 60 MFR to OA SCS to L FALX-N,   MFALX-N,   TENT-N,   BRAY-N,   SCS to ABD-N,   OM-N,   OLF-N,   EMPERATRIZ-N,   TRO-N,   HYP-N,   SA-N,   Cranial bone releases to Sphenoid   Temporal,  Nasal bones    Releases achieved,   HA resolved,   Eyes can better focus   Neuromuscular Re-education     Therapeutic Activity     Therapeutic Exercises     Gait training     Modality__________________                Total 60    Blank areas are intentional and mean the treatment did not include these items.       Suresh Espinoza  2017

## 2021-06-14 NOTE — PROGRESS NOTES
[facility] Daily Progress     [facility] Discharge Summary    Patient Name: Ruth Ann French  Date: 11/29/2017  Referral Diagnosis: [unfilled]  Referring provider: Perri Dominique MD  Visit Diagnosis:   1. Cervicalgia     2. Post concussion syndrome     3. Acute post-traumatic headache, not intractable     4. Chronic bilateral low back pain without sciatica     5. Chronic neck pain     6. Intractable chronic post-traumatic headache         Goals::    Pt. will demonstrate/verbalize independence in self-management of condition in : 12 weeks     Met  Pt. will be independent with home exercise program in : 4 weeks     Met  Pt. will report decreased intensity, frequency of : Headache;in 6 weeks      Partially met  Pt. will decrease use of medication for pain for improved quality of life in : 6 weeks      Progressing  Pt. will have improved quality of sleep: waking less times/night;in 6 weeks;getting 75-90% of required amount      Partially Met  Patient will transfer: for car;with less pain;with less difficulty;in 6 weeks      Met  Patient Turn Head: for driving;for watching traffic;for conversation;for computer;for work;with partial ROM;with less pain;in 12 weeks      Partially Met  Patient will look up / down: for shaving;for drinking;for reading;for computer work;with partial ROM;with less pain;with less difficulty;in 12 weeks   Progressing      Patient was seen for 54 visits from 2/29/16 to 11/29/17 with 0 missed appointments.  Pt has progressed to the point where he is able to manage his Head Aches, Neck and back pain.  He experiences flare ups of the pain in the before mentioned tavares but is able to manage the pain issues.  He will be seeing his Neurologist and Opthalmologist for follow up.   Work still presents some difficulties especially with lighting conditions.     Therapy will be discontinued at this time.  The patient will need a new referral to resume.    Thank you for your  referral.  Suresh Espinoza  11/29/2017  1:43 PM  Patient Name: Ruth Ann French  Date: 11/29/2017  Visit #: 7/12 + 47    Referral Diagnosis:HA's Neck pain  Referring provider: Perri Dominique MD  Visit Diagnosis:     ICD-10-CM    1. Cervicalgia M54.2    2. Post concussion syndrome F07.81    3. Acute post-traumatic headache, not intractable G44.319    4. Chronic bilateral low back pain without sciatica M54.5     G89.29    5. Chronic neck pain M54.2     G89.29    6. Intractable chronic post-traumatic headache G44.321          Assessment:     Patient demonstrates understanding/independence with home program.  Patient is benefitting from skilled physical therapy and is making steady progress toward functional goals.  Patient is appropriate to continue with skilled physical therapy intervention, as indicated by initial plan of care.    Goal Status    :Pt. will demonstrate/verbalize independence in self-management of condition in : 12 weeks     Met  Pt. will be independent with home exercise program in : 4 weeks     Met  Pt. will report decreased intensity, frequency of : Headache;in 6 weeks      Partially met  Pt. will decrease use of medication for pain for improved quality of life in : 6 weeks      Progressing  Pt. will have improved quality of sleep: waking less times/night;in 6 weeks;getting 75-90% of required amount      Partially Met  Patient will transfer: for car;with less pain;with less difficulty;in 6 weeks      Met  Patient Turn Head: for driving;for watching traffic;for conversation;for computer;for work;with partial ROM;with less pain;in 12 weeks      Partially Met  Patient will look up / down: for shaving;for drinking;for reading;for computer work;with partial ROM;with less pain;with less difficulty;in 12 weeks   Progressing      Plan / Patient Education:     Continue with initial plan of care.    Subjective:     Pain Rating: 3  HA Pain    LBP      Objective:     R SI tension       Treatment Today      TREATMENT MINUTES COMMENTS   Evaluation     Self-care/ Home management     Manual therapy 60 MFR with OA, L5-SI and SI joint releases, Dural Tube Pull.    Cranial release Sphenoid bone   SCS to LF R T11, L1,2,S2-MS.   SCS to LFT11,12, LS2-MS.    PLLL1,2,4R-MS    SCS to TRIG1,3-N,   FAC1,2,3-N,       Releases achieved.   SI dysf resolved.   Stiffness to the Low Back persists.   HA resolved.   Neck tension decreased.   Pt sees his Opthalmologist  And Neurologist in the near future for evaluatuion of his condition.   We will be discontinuing therapy with the pt to evaluate how his condition is without  Therapy.   Neuromuscular Re-education     Therapeutic Activity     Therapeutic Exercises     Gait training     Modality__________________                Total 60    Blank areas are intentional and mean the treatment did not include these items.       Suresh Espinoza  11/29/2017

## 2021-06-14 NOTE — PROGRESS NOTES
[facility] Daily Progress     Patient Name: Ruth Ann French  Date: 11/15/2017  Visit #:    Referral Diagnosis: [unfilled]  Referring provider: Perri Dominique MD  Visit Diagnosis:     ICD-10-CM    1. Cervicalgia M54.2    2. Post concussion syndrome F07.81    3. Acute post-traumatic headache, not intractable G44.319    4. Chronic bilateral low back pain without sciatica M54.5     G89.29          Assessment:     Patient demonstrates understanding/independence with home program.  Patient is benefitting from skilled physical therapy and is making steady progress toward functional goals.  Patient is appropriate to continue with skilled physical therapy intervention, as indicated by initial plan of care.    Goal Status:  No Data Recorded  No Data Recorded    Plan / Patient Education:     Continue with initial plan of care.    Subjective:     Pain Ratin/10 to GALINDO and L Knee, Hip        Objective:     Cranial and L LB tender points    Treatment Today    TREATMENT MINUTES COMMENTS   Evaluation     Self-care/ Home management     Manual therapy 60 MFR with OA, L5-SI and SI joint releases, Dural Tube Pull.    SCS to Ant L1,  L  Psoas,   Temporal, frontal, Nasal, Sphenoid releases.   SCS to Cranial nerves # 1,2,3,4,5,6,7,9-N.       Pain to the LB resolved   Pain to the L Hip reduced 4/10 to 2/10  Psoas released  HA resolved,   Facial Pain reduced 1/10   Neuromuscular Re-education     Therapeutic Activity     Therapeutic Exercises     Gait training     Modality__________________                Total 60    Blank areas are intentional and mean the treatment did not include these items.       Suresh Espinoza  11/15/2017

## 2021-07-03 NOTE — ADDENDUM NOTE
Addendum Note by Analia Espinoza PT at 8/10/2017  5:18 PM     Author: Analia Espinoza PT Service: -- Author Type: Physical Therapist    Filed: 8/10/2017  5:18 PM Encounter Date: 8/10/2017 Status: Signed    : Analia Espinoza PT (Physical Therapist)    Addended by: ANALIA ESPINOZA on: 8/10/2017 05:18 PM        Modules accepted: Orders

## 2021-07-03 NOTE — ADDENDUM NOTE
Addendum Note by Analia Espinoza PT at 1/24/2017 12:51 PM     Author: Analia Espinoza PT Service: -- Author Type: Physical Therapist    Filed: 1/24/2017 12:51 PM Encounter Date: 1/24/2017 Status: Signed    : Analia Espinoza PT (Physical Therapist)    Addended by: ANALIA ESPINOZA on: 1/24/2017 12:51 PM        Modules accepted: Orders

## 2021-10-24 ENCOUNTER — HEALTH MAINTENANCE LETTER (OUTPATIENT)
Age: 66
End: 2021-10-24

## 2021-10-29 ENCOUNTER — TRANSFERRED RECORDS (OUTPATIENT)
Dept: HEALTH INFORMATION MANAGEMENT | Facility: CLINIC | Age: 66
End: 2021-10-29

## 2022-03-01 ENCOUNTER — MEDICAL CORRESPONDENCE (OUTPATIENT)
Dept: HEALTH INFORMATION MANAGEMENT | Facility: CLINIC | Age: 67
End: 2022-03-01
Payer: COMMERCIAL

## 2022-03-04 DIAGNOSIS — M25.579 ANKLE PAIN: Primary | ICD-10-CM

## 2022-03-08 ENCOUNTER — MEDICAL CORRESPONDENCE (OUTPATIENT)
Dept: HEALTH INFORMATION MANAGEMENT | Facility: CLINIC | Age: 67
End: 2022-03-08
Payer: COMMERCIAL

## 2022-03-22 ENCOUNTER — ANCILLARY PROCEDURE (OUTPATIENT)
Dept: GENERAL RADIOLOGY | Facility: CLINIC | Age: 67
End: 2022-03-22
Attending: ORTHOPAEDIC SURGERY
Payer: OTHER MISCELLANEOUS

## 2022-03-22 ENCOUNTER — OFFICE VISIT (OUTPATIENT)
Dept: ORTHOPEDICS | Facility: CLINIC | Age: 67
End: 2022-03-22
Payer: OTHER MISCELLANEOUS

## 2022-03-22 VITALS — WEIGHT: 239 LBS | BODY MASS INDEX: 32.02 KG/M2

## 2022-03-22 DIAGNOSIS — M25.579 ANKLE PAIN: ICD-10-CM

## 2022-03-22 DIAGNOSIS — M25.571 PAIN IN JOINT, ANKLE AND FOOT, RIGHT: Primary | ICD-10-CM

## 2022-03-22 PROCEDURE — 99213 OFFICE O/P EST LOW 20 MIN: CPT | Performed by: ORTHOPAEDIC SURGERY

## 2022-03-22 PROCEDURE — 73610 X-RAY EXAM OF ANKLE: CPT | Mod: RT | Performed by: RADIOLOGY

## 2022-03-22 NOTE — LETTER
3/22/2022      RE: Ruth Ann French  [address] [place] [address]    Dear Colleague,    Thank you for referring your patient, Ruth Ann French, to the [facility]. Please see a copy of my visit note below.    CHIEF COMPLAINT:    1.  Right foot metatarsalgia.  2.  Right ankle and subtalar joint arthritis.    HISTORY OF PRESENT ILLNESS:  Mr. French presents today for further followup at the request of Worker's Compensation.  The patient reports to be doing well.  The patient is getting gotten updated orthotics, which have already been approved.      Reports to have difficulties with healing of his feet as well as feeling hot or cold on his feet.    Reports also to be having more frequent falls to the point that on one of them he sustained bilateral retinal detachment.    No physical exam was performed today.    ASSESSMENT:    1.  Right foot metatarsalgia.  2.  Right ankle and subtalar joint arthritis.  3.  Possible peripheral neuropathy.    PLAN:  Discussed with the patient that at this point, I would suggest him be referred to Neurology for evaluation of peripheral neuropathy.  I am concerned that his balance issues and his lack of proprioception are from the neuropathy.    The patient also reports to have been admitted to the TBI clinic, which I think is also very smart.    The patient will follow up on a p.r.n. basis.  All questions were answered.    TT:  20 minutes.  CT:  15 minutes.    Jack Laguna MD

## 2022-03-22 NOTE — PROGRESS NOTES
CHIEF COMPLAINT:    1.  Right foot metatarsalgia.  2.  Right ankle and subtalar joint arthritis.    HISTORY OF PRESENT ILLNESS:  Mr. French presents today for further followup at the request of Worker's Compensation.  The patient reports to be doing well.  The patient is getting gotten updated orthotics, which have already been approved.      Reports to have difficulties with healing of his feet as well as feeling hot or cold on his feet.    Reports also to be having more frequent falls to the point that on one of them he sustained bilateral retinal detachment.    No physical exam was performed today.    ASSESSMENT:    1.  Right foot metatarsalgia.  2.  Right ankle and subtalar joint arthritis.  3.  Possible peripheral neuropathy.    PLAN:  Discussed with the patient that at this point, I would suggest him be referred to Neurology for evaluation of peripheral neuropathy.  I am concerned that his balance issues and his lack of proprioception are from the neuropathy.    The patient also reports to have been admitted to the TBI clinic, which I think is also very smart.    The patient will follow up on a p.r.n. basis.  All questions were answered.    TT:  20 minutes.  CT:  15 minutes.

## 2022-03-22 NOTE — NURSING NOTE
Reason For Visit:   Chief Complaint   Patient presents with     RECHECK     Right ankle follow up for injury from 1986       Wt 108.4 kg (239 lb)   BMI 32.02 kg/m      Pain Assessment  Patient Currently in Pain: Yes  0-10 Pain Scale: 5  Primary Pain Location: Ankle    Cihco Levine, EMT

## 2022-06-05 ENCOUNTER — HEALTH MAINTENANCE LETTER (OUTPATIENT)
Age: 67
End: 2022-06-05

## 2022-09-01 NOTE — TELEPHONE ENCOUNTER
9/1/2022-Request for images faxed to [facility]-MR @ 910am    9/14/2022-[facility] Images now in PACS-MR @ 539am    FUTURE VISIT INFORMATION      FUTURE VISIT INFORMATION:    Date: 9/19/2022    Time: 130pm    [place] [facility] [place] INFORMATION:    Referring provider:  Dr. Laguna     Referring providers clinic:  [facility]     Reason for visit/diagnosis  Neuropathy     RECORDS REQUESTED FROM:       Clinic name Comments Records Status Imaging Status   Internal Dr. Laguna-3/22/2022    CT Head-4/10/2019    [facility]         [facility]   Care Everywhere Requested to PACS

## 2022-09-19 ENCOUNTER — LAB (OUTPATIENT)
Dept: LAB | Facility: CLINIC | Age: 67
End: 2022-09-19

## 2022-09-19 ENCOUNTER — OFFICE VISIT (OUTPATIENT)
Dept: NEUROLOGY | Facility: CLINIC | Age: 67
End: 2022-09-19
Attending: ORTHOPAEDIC SURGERY
Payer: COMMERCIAL

## 2022-09-19 ENCOUNTER — PRE VISIT (OUTPATIENT)
Dept: NEUROLOGY | Facility: CLINIC | Age: 67
End: 2022-09-19

## 2022-09-19 VITALS
WEIGHT: 238 LBS | RESPIRATION RATE: 16 BRPM | BODY MASS INDEX: 31.89 KG/M2 | HEART RATE: 60 BPM | DIASTOLIC BLOOD PRESSURE: 84 MMHG | SYSTOLIC BLOOD PRESSURE: 138 MMHG | TEMPERATURE: 98.3 F | OXYGEN SATURATION: 98 %

## 2022-09-19 DIAGNOSIS — G62.9 NEUROPATHY: Primary | ICD-10-CM

## 2022-09-19 DIAGNOSIS — G62.9 NEUROPATHY: ICD-10-CM

## 2022-09-19 LAB
TOTAL PROTEIN SERUM FOR ELP: 6.4 G/DL (ref 6.4–8.3)
VIT B12 SERPL-MCNC: 467 PG/ML (ref 232–1245)

## 2022-09-19 PROCEDURE — 86334 IMMUNOFIX E-PHORESIS SERUM: CPT | Performed by: PATHOLOGY

## 2022-09-19 PROCEDURE — 99204 OFFICE O/P NEW MOD 45 MIN: CPT | Performed by: INTERNAL MEDICINE

## 2022-09-19 PROCEDURE — 99000 SPECIMEN HANDLING OFFICE-LAB: CPT | Performed by: PATHOLOGY

## 2022-09-19 PROCEDURE — 84155 ASSAY OF PROTEIN SERUM: CPT | Mod: 90 | Performed by: PATHOLOGY

## 2022-09-19 PROCEDURE — 84165 PROTEIN E-PHORESIS SERUM: CPT | Performed by: PATHOLOGY

## 2022-09-19 PROCEDURE — 82607 VITAMIN B-12: CPT | Mod: 90 | Performed by: PATHOLOGY

## 2022-09-19 PROCEDURE — 36415 COLL VENOUS BLD VENIPUNCTURE: CPT | Performed by: PATHOLOGY

## 2022-09-19 NOTE — PROGRESS NOTES
[facility] Neurology Consultation    Ruth Ann French MRN# [id number]   Age: 66 year old YOB: 1955     Requesting physician: Socrates Delgado     Reason for Consultation: right foot numbness      History of Presenting Symptoms:   Ruth Ann French is a 66 year old male who presents today for evaluation of right foot numbness.     Patient has had several right ankle surgeries in the remote past. He had a fracture in the right ankle in his 80s and then several tendon transfers several years later. He thinks it is possible he had some numbness in the right foot after surgeries, but thinks it got better.     In the last several years he has noticed some numbness in the right ankle. He denies any numbness in the left ankle. He denies clear weakness in the right foot. He thinks the numbness in the right foot has gotten a little worse with time.     He has history of 3 TBI, first in 2015 and last in 2021.     Through exercise and diet patient has lost a significant amount of weight in the last few years (~50 pounds). He tries to walk 20,000 steps a day.     He has some low back pain, but it doesn't significantly radiate down the legs.       Past Medical History:   There is no problem list on file for this patient.    Past Medical History:   Diagnosis Date     Depressive disorder 9/28/2015    complication from TBI     Neck injuries 9/28/2015    Fall on concrete, TBI, face and neck injury     Reduced vision 9/28/2015    Area of brain behind right eye damaged in TBI     Shortness of breath past year    get short of breath easy     Surgical complication August 2012    pain in right kidney after kidney stone removal        Past Surgical History:   No past surgical history on file.     Social History:     Social History     Tobacco Use     Smoking status: Never Smoker        Family History:   No family history on file.     Medications:     Current Outpatient Medications   Medication Sig      Acetaminophen-Codeine (TYLENOL WITH CODEINE #3 PO)      finasteride (PROSCAR) 5 MG tablet Take 5 mg by mouth     glucosamine-chondroitin 500-400 MG CAPS per capsule 1 Cap daily.     indomethacin (INDOCIN) 25 MG capsule 1 pill BID for 2 weeks with food.  If tolerating can increase to 2 pills BID.     Multiple vitamin TABS      omega 3 1000 MG CAPS Take 4 g by mouth     ORDER FOR DME Lace up Ankle brace for rt ankle/foot pain (Patient not taking: Reported on 2/18/2020)     No current facility-administered medications for this visit.        Allergies:     Allergies   Allergen Reactions     Diclofenac Swelling     Voltaren Swelling     Wheat Bran Cough     Possibility of Sensitivity        Review of Systems:   As above     Physical Exam:   Vitals: /84   Pulse 60   Temp 98.3  F (36.8  C)   Resp 16   Wt 108 kg (238 lb)   SpO2 98%   BMI 31.89 kg/m     General: Seated comfortably in no acute distress.  HEENT: Optic discs sharp and vasculature normal on funduscopic exam.   Lungs: breathing comfortably  Neurologic:     Mental Status: Fully alert, attentive. Normal memory and fund of knowledge. Language normal, speech clear and fluent, no paraphasic errors.      Cranial Nerves: Visual fields intact. PERRL. EOMI with normal smooth pursuit. Facial sensation intact/symmetric. Facial movements symmetric. Hearing not formally tested but intact to conversation. Palate elevation symmetric, uvula midline. No dysarthria. Shoulder shrug strong bilaterally. Tongue protrusion midline.     Motor: No tremors or other abnormal movements observed. Muscle tone normal throughout. Normal/symmetric rapid finger tapping. Strength 5/5 throughout upper and lower extremities as below.      Right Left   Shoulder abduction        5 5   Elbow extension 5 5   Elbow flexion 5 5   Wrist extension         5 5   Finger extension 5 5   ADM 5 5   FDI 5 5   APB 5 5   Hip flexion 5 5   Knee flexion 5 5   Knee extension 5 5   EHL 5 5   Dorsiflexion 5  5   Plantar flexion 5 5        Deep Tendon Reflexes: 1+/symmetric throughout upper and lower extremities. No clonus. Toes downgoing bilaterally.     Sensory: Intact/symmetric to proprioception throughout upper and lower extremities. Decreased sensation to light touch on the top of the right foot, tops of the toes, and right ankle. Temperature sensation is decreased in the bilateral feet compared to hands. Vibration sense is absent in the right great toe, ~5 seconds on the right ankle, ~3 seconds on the left great toe, ~5 seconds in the left ankle, and normal in the hands. Pinprick sensation is generally decreased below the right ankle, otherwise intact. Negative Romberg.      Coordination: Finger-nose-finger and heel-shin intact without dysmetria. Rapid alternating movements intact/symmetric with normal speed and rhythm.     Gait: Steady casual gait, but walks with a right leg limp due to recent right ankle injury. Not able to walk on right heels, toe, or tandem due to recent injury.          Data: Pertinent prior to visit   Imaging:  CT brain 2019  Findings:    On the noncontrast images of the brain, there is no definite  intracranial hemorrhage, mass affect, or midline shift. The ventricles  are not enlarged. The gray to white matter differentiation of the  cerebral hemispheres is preserved. The basal cisterns are patent.  The visualized paranasal sinuses are clear. The mastoid air cells are clear.    CT cervical 2019  Impression:    1. No acute fracture subluxation.  2. Multilevel degenerative disease, most prominent being the severe  foraminal stenosis on the right at C4-5 and C6-7, and the multilevel  mild spinal canal stenoses described in detail above.     Procedures:  None    Laboratory:  A1c 5.7 (8/2022)         Assessment and Plan:   Assessment:  Ruth Ann French is a 66 year old male with history of multiple right ankle orthopedic surgeries who presents today for evaluation of right foot numbness. Based on  the examination I suspect patient has a distal length dependent polyneuropathy. I suspect it is more symptomatically noticeable on the right side due to possible pre-existing damage to sensory nerve fibers from multiple orthopedic surgeries. Patient has history of pre-diabetes, which is a potential etiology. We discussed the importance of control of vascular risk factors, including pre-diabetes, to help prevent progression of neuropathy. Additional neuropathy labs were ordered as below. He is not having significant neuropathic pain. EMG of the bilateral lower extremities could be considered if there is significant worsening in the future.      Plan:  - Serum B12, ELP, immunofixation  - Continued control of vascular risk factors including pre-diabetes  - Call with significant worsening of symptoms    Follow up in Neurology clinic as needed should new concerns arise.    Robin rTan MD   of Neurology  [facility]      The total time of this encounter today amounted to 48 minutes. This time included time spent with the patient, prep work, ordering tests, and performing post visit documentation.

## 2022-09-19 NOTE — LETTER
9/19/2022       RE: Ruth Ann French  [address] [place] [address]     Dear Colleague,    Thank you for referring your patient, Ruth Ann French, to the [facility] [place] at [facility]. Please see a copy of my visit note below.    [facility] Neurology Consultation    Ruth Ann French MRN# [id number]   Age: 66 year old YOB: 1955     Requesting physician: Socrates Delgado     Reason for Consultation: right foot numbness      History of Presenting Symptoms:   Ruth Ann French is a 66 year old male who presents today for evaluation of right foot numbness.     Patient has had several right ankle surgeries in the remote past. He had a fracture in the right ankle in his 80s and then several tendon transfers several years later. He thinks it is possible he had some numbness in the right foot after surgeries, but thinks it got better.     In the last several years he has noticed some numbness in the right ankle. He denies any numbness in the left ankle. He denies clear weakness in the right foot. He thinks the numbness in the right foot has gotten a little worse with time.     He has history of 3 TBI, first in 2015 and last in 2021.     Through exercise and diet patient has lost a significant amount of weight in the last few years (~50 pounds). He tries to walk 20,000 steps a day.     He has some low back pain, but it doesn't significantly radiate down the legs.       Past Medical History:   There is no problem list on file for this patient.    Past Medical History:   Diagnosis Date     Depressive disorder 9/28/2015    complication from TBI     Neck injuries 9/28/2015    Fall on concrete, TBI, face and neck injury     Reduced vision 9/28/2015    Area of brain behind right eye damaged in TBI     Shortness of breath past year    get short of breath easy     Surgical complication August 2012    pain in right kidney  after kidney stone removal        Past Surgical History:   No past surgical history on file.     Social History:     Social History     Tobacco Use     Smoking status: Never Smoker        Family History:   No family history on file.     Medications:     Current Outpatient Medications   Medication Sig     Acetaminophen-Codeine (TYLENOL WITH CODEINE #3 PO)      finasteride (PROSCAR) 5 MG tablet Take 5 mg by mouth     glucosamine-chondroitin 500-400 MG CAPS per capsule 1 Cap daily.     indomethacin (INDOCIN) 25 MG capsule 1 pill BID for 2 weeks with food.  If tolerating can increase to 2 pills BID.     Multiple vitamin TABS      omega 3 1000 MG CAPS Take 4 g by mouth     ORDER FOR DME Lace up Ankle brace for rt ankle/foot pain (Patient not taking: Reported on 2/18/2020)     No current facility-administered medications for this visit.        Allergies:     Allergies   Allergen Reactions     Diclofenac Swelling     Voltaren Swelling     Wheat Bran Cough     Possibility of Sensitivity        Review of Systems:   As above     Physical Exam:   Vitals: /84   Pulse 60   Temp 98.3  F (36.8  C)   Resp 16   Wt 108 kg (238 lb)   SpO2 98%   BMI 31.89 kg/m     General: Seated comfortably in no acute distress.  HEENT: Optic discs sharp and vasculature normal on funduscopic exam.   Lungs: breathing comfortably  Neurologic:     Mental Status: Fully alert, attentive. Normal memory and fund of knowledge. Language normal, speech clear and fluent, no paraphasic errors.      Cranial Nerves: Visual fields intact. PERRL. EOMI with normal smooth pursuit. Facial sensation intact/symmetric. Facial movements symmetric. Hearing not formally tested but intact to conversation. Palate elevation symmetric, uvula midline. No dysarthria. Shoulder shrug strong bilaterally. Tongue protrusion midline.     Motor: No tremors or other abnormal movements observed. Muscle tone normal throughout. Normal/symmetric rapid finger tapping. Strength 5/5  throughout upper and lower extremities as below.      Right Left   Shoulder abduction        5 5   Elbow extension 5 5   Elbow flexion 5 5   Wrist extension         5 5   Finger extension 5 5   ADM 5 5   FDI 5 5   APB 5 5   Hip flexion 5 5   Knee flexion 5 5   Knee extension 5 5   EHL 5 5   Dorsiflexion 5 5   Plantar flexion 5 5        Deep Tendon Reflexes: 1+/symmetric throughout upper and lower extremities. No clonus. Toes downgoing bilaterally.     Sensory: Intact/symmetric to proprioception throughout upper and lower extremities. Decreased sensation to light touch on the top of the right foot, tops of the toes, and right ankle. Temperature sensation is decreased in the bilateral feet compared to hands. Vibration sense is absent in the right great toe, ~5 seconds on the right ankle, ~3 seconds on the left great toe, ~5 seconds in the left ankle, and normal in the hands. Pinprick sensation is generally decreased below the right ankle, otherwise intact. Negative Romberg.      Coordination: Finger-nose-finger and heel-shin intact without dysmetria. Rapid alternating movements intact/symmetric with normal speed and rhythm.     Gait: Steady casual gait, but walks with a right leg limp due to recent right ankle injury. Not able to walk on right heels, toe, or tandem due to recent injury.          Data: Pertinent prior to visit   Imaging:  CT brain 2019  Findings:    On the noncontrast images of the brain, there is no definite  intracranial hemorrhage, mass affect, or midline shift. The ventricles  are not enlarged. The gray to white matter differentiation of the  cerebral hemispheres is preserved. The basal cisterns are patent.  The visualized paranasal sinuses are clear. The mastoid air cells are clear.    CT cervical 2019  Impression:    1. No acute fracture subluxation.  2. Multilevel degenerative disease, most prominent being the severe  foraminal stenosis on the right at C4-5 and C6-7, and the multilevel  mild  spinal canal stenoses described in detail above.     Procedures:  None    Laboratory:  A1c 5.7 (8/2022)         Assessment and Plan:   Assessment:  Ruth Ann French is a 66 year old male with history of multiple right ankle orthopedic surgeries who presents today for evaluation of right foot numbness. Based on the examination I suspect patient has a distal length dependent polyneuropathy. I suspect it is more symptomatically noticeable on the right side due to possible pre-existing damage to sensory nerve fibers from multiple orthopedic surgeries. Patient has history of pre-diabetes, which is a potential etiology. We discussed the importance of control of vascular risk factors, including pre-diabetes, to help prevent progression of neuropathy. Additional neuropathy labs were ordered as below. He is not having significant neuropathic pain. EMG of the bilateral lower extremities could be considered if there is significant worsening in the future.      Plan:  - Serum B12, ELP, immunofixation  - Continued control of vascular risk factors including pre-diabetes  - Call with significant worsening of symptoms    Follow up in Neurology clinic as needed should new concerns arise.    Robin Tran MD   of Neurology  [facility]      The total time of this encounter today amounted to 48 minutes. This time included time spent with the patient, prep work, ordering tests, and performing post visit documentation.

## 2022-09-20 LAB
ALBUMIN SERPL ELPH-MCNC: 4.2 G/DL (ref 3.7–5.1)
ALPHA1 GLOB SERPL ELPH-MCNC: 0.2 G/DL (ref 0.2–0.4)
ALPHA2 GLOB SERPL ELPH-MCNC: 0.7 G/DL (ref 0.5–0.9)
B-GLOBULIN SERPL ELPH-MCNC: 0.7 G/DL (ref 0.6–1)
GAMMA GLOB SERPL ELPH-MCNC: 0.5 G/DL (ref 0.7–1.6)
M PROTEIN SERPL ELPH-MCNC: 0 G/DL
PROT PATTERN SERPL ELPH-IMP: ABNORMAL
PROT PATTERN SERPL IFE-IMP: NORMAL

## 2022-10-15 ENCOUNTER — HEALTH MAINTENANCE LETTER (OUTPATIENT)
Age: 67
End: 2022-10-15

## 2023-02-07 ENCOUNTER — MEDICAL CORRESPONDENCE (OUTPATIENT)
Dept: HEALTH INFORMATION MANAGEMENT | Facility: CLINIC | Age: 68
End: 2023-02-07
Payer: COMMERCIAL

## 2023-02-07 ENCOUNTER — DOCUMENTATION ONLY (OUTPATIENT)
Dept: ORTHOPEDICS | Facility: CLINIC | Age: 68
End: 2023-02-07
Payer: COMMERCIAL

## 2023-02-07 NOTE — PROGRESS NOTES
Received Completed forms Yes   Faxed Forms Faxed To: [facility]  Fax Number: [phone]   Sent to HIM (Date) 2/7/23

## 2023-02-21 ENCOUNTER — MEDICAL CORRESPONDENCE (OUTPATIENT)
Dept: HEALTH INFORMATION MANAGEMENT | Facility: CLINIC | Age: 68
End: 2023-02-21
Payer: COMMERCIAL

## 2023-02-22 ENCOUNTER — DOCUMENTATION ONLY (OUTPATIENT)
Dept: ORTHOPEDICS | Facility: CLINIC | Age: 68
End: 2023-02-22
Payer: COMMERCIAL

## 2023-02-22 ENCOUNTER — TELEPHONE (OUTPATIENT)
Dept: ORTHOPEDICS | Facility: CLINIC | Age: 68
End: 2023-02-22
Payer: COMMERCIAL

## 2023-02-22 NOTE — PROGRESS NOTES
Received Completed forms Yes   Faxed Forms Faxed To: [facility]  Fax Number: [phone]   Sent to HIM (Date) 2/21/23

## 2023-02-22 NOTE — TELEPHONE ENCOUNTER
[facility] Call Center    Phone Message    May a detailed message be left on voicemail: yes     Reason for Call: Other: Stephanie from the [facility] stated that they only received a blank page. Please refax the detailed written order which also includes coding for inserts.   Fax#: [phone]  Email: [email or web address]    Action Taken: Message routed to:  [facility] Orthopedics    Travel Screening: Not Applicable

## 2023-02-23 NOTE — TELEPHONE ENCOUNTER
[facility] Call Center    Phone Message    May a detailed message be left on voicemail: yes     Reason for Call: Other: This is the 3rd request. Stephanie got the fax however it's just another blank page. Please refax/contact Stephanie back.     Action Taken: Message routed to:  [facility] Orthopedics    Travel Screening: Not Applicable

## 2023-02-27 NOTE — TELEPHONE ENCOUNTER
[facility] Call Center    Phone Message    May a detailed message be left on voicemail: yes     Reason for Call: Other: please use fax number [phone], thay are still having trouble recieving fax     Action Taken: Message routed to:  [facility] ammon    Travel Screening: Not Applicable

## 2023-06-11 ENCOUNTER — HEALTH MAINTENANCE LETTER (OUTPATIENT)
Age: 68
End: 2023-06-11

## 2024-01-29 ENCOUNTER — TELEPHONE (OUTPATIENT)
Dept: OPHTHALMOLOGY | Facility: CLINIC | Age: 69
End: 2024-01-29
Payer: COMMERCIAL

## 2024-01-29 NOTE — TELEPHONE ENCOUNTER
Spoke to wife - Ruth Ann not able to talk     Ok to schedule with Dr. Gomez for NEW TBI - per pt vision problems in right eye from a TBI pt had a fall in 2021 where pt tore both retinas and had surgery for that. Pt advises he is having more vision issues and head aches. Pt was advised of referral needed to see neuro op, pt advised theres a phone referral from [facility] gave them our number. Pt asks if he can see Dr Gomez he has seen an neuro optometrist prior. Dr Dalton OD at [facility] TBI program and pt wants to establish with our neuro group.     Elmira Rodriguez Communication Facilitator on 1/29/2024 at 11:17 AM

## 2024-03-18 ENCOUNTER — OFFICE VISIT (OUTPATIENT)
Dept: OPHTHALMOLOGY | Facility: CLINIC | Age: 69
End: 2024-03-18
Payer: COMMERCIAL

## 2024-03-18 DIAGNOSIS — H52.223 REGULAR ASTIGMATISM WITH PRESBYOPIA, BILATERAL: Primary | ICD-10-CM

## 2024-03-18 DIAGNOSIS — H50.51 ESOPHORIA: ICD-10-CM

## 2024-03-18 DIAGNOSIS — Z86.69 HISTORY OF RETINAL TEAR: ICD-10-CM

## 2024-03-18 DIAGNOSIS — H52.4 REGULAR ASTIGMATISM WITH PRESBYOPIA, BILATERAL: Primary | ICD-10-CM

## 2024-03-18 PROCEDURE — 92060 SENSORIMOTOR EXAMINATION: CPT

## 2024-03-18 PROCEDURE — 92004 COMPRE OPH EXAM NEW PT 1/>: CPT

## 2024-03-18 PROCEDURE — G0463 HOSPITAL OUTPT CLINIC VISIT: HCPCS

## 2024-03-18 RX ORDER — VITAMIN B COMPLEX
1 TABLET ORAL DAILY
COMMUNITY

## 2024-03-18 RX ORDER — ALBUTEROL SULFATE 90 UG/1
1-2 AEROSOL, METERED RESPIRATORY (INHALATION)
COMMUNITY
Start: 2023-06-30

## 2024-03-18 ASSESSMENT — REFRACTION_MANIFEST
OS_AXIS: 175
OD_CYLINDER: +0.50
OS_SPHERE: -0.25
OS_CYLINDER: +1.00
OD_AXIS: 010
OD_ADD: +2.50
OD_SPHERE: -0.50
OS_ADD: +2.50

## 2024-03-18 ASSESSMENT — VISUAL ACUITY
OD_CC: 20/25
OS_CC+: -1
CORRECTION_TYPE: GLASSES
OD_CC+: +2
METHOD: SNELLEN - LINEAR
OS_CC: 20/20

## 2024-03-18 ASSESSMENT — TONOMETRY
OD_IOP_MMHG: 12
IOP_METHOD: ICARE
OS_IOP_MMHG: 11

## 2024-03-18 ASSESSMENT — SLIT LAMP EXAM - LIDS
COMMENTS: 1+ MGD, DERMATOCHALASIS
COMMENTS: 1+ MGD, DERMATOCHALASIS

## 2024-03-18 ASSESSMENT — REFRACTION_FINALRX
OD_HPRISM: 2 BD
OD_HPRISM: 2 BD

## 2024-03-18 ASSESSMENT — CONF VISUAL FIELD
OS_NORMAL: 1
OS_INFERIOR_TEMPORAL_RESTRICTION: 0
OD_SUPERIOR_TEMPORAL_RESTRICTION: 0
OD_NORMAL: 1
OD_SUPERIOR_NASAL_RESTRICTION: 0
OS_SUPERIOR_TEMPORAL_RESTRICTION: 0
METHOD: COUNTING FINGERS
OD_INFERIOR_NASAL_RESTRICTION: 0
OS_SUPERIOR_NASAL_RESTRICTION: 0
OS_INFERIOR_NASAL_RESTRICTION: 0
OD_INFERIOR_TEMPORAL_RESTRICTION: 0

## 2024-03-18 ASSESSMENT — REFRACTION_WEARINGRX
OS_SPHERE: -0.25
SPECS_TYPE: PAL
OD_ADD: +2.00
OD_AXIS: 005
OD_CYLINDER: +0.50
OS_AXIS: 175
OS_CYLINDER: +0.75
OS_ADD: +2.00
OD_SPHERE: -0.50
OD_VPRISM: 2 BD

## 2024-03-18 ASSESSMENT — EXTERNAL EXAM - RIGHT EYE: OD_EXAM: NORMAL

## 2024-03-18 ASSESSMENT — CUP TO DISC RATIO
OS_RATIO: 0.35
OD_RATIO: 0.45

## 2024-03-18 ASSESSMENT — EXTERNAL EXAM - LEFT EYE: OS_EXAM: NORMAL

## 2024-03-18 NOTE — PROGRESS NOTES
"HPI:    Ruth Ann is here today for a comprehensive eye exam.  He has a history of traumatic brain injury 10 years ago.  About 2 years ago, he fell and hit the front of his head.  Shortly after, he was found to have a retinal tear in the right eye.  Prophylactic laser by retinal specialist.    He denies double vision.  He notes that he has done vision therapy, following his first TBI.  He recalls doing eye exercises with prism, but denies prism glasses wear.  He notes intermittent, daily headaches.  He is photophobic.    He has a constant, right eye pain.  Occasionally it is sharp, but generally just \"hurts.\"    He has had a vestibular work-up.  He has had 2.5 years of vestibular PT.    He does not have a routine eye care provider at this time.    Review of outside notes:  Visit with Dr. Maher 11/22/21:  1. Convergence insufficiency and vertical phoria: Sensorimotor evaluation revealed no strabismus at distance or near, Flat fusion and deficient stereopsis. Educated pt on findings. Prescribed vertical (2^ down right eye) prism- this was also in previous prescription. Discussed treatment with neuro-vision rehabilitation through occupational therapy- pt would like to proceed, but has not evaluated by TBI provider since latest fall March 2021. I advised him that given the force of the fall triggered retinal injury and regression of previous TBI symptoms, he should be re-evaluated in the TBI Clinic by a TBI provider to determine if he suffered another traumatic brain injury and let TBI provider know he would like to resume OT to obtain order. Patient will request referral from primary care provider for TBI provider visit.   2. Hyperopia, left eye; regular astigmatism, both eyes with presbyopia: Educated pt on findings. Prescribed new glasses.   3. Ocular motor dysfunction: Educated pt on findings. Discussed treatment with neuro-vision rehabilitation through occupational therapy.  4. Ambient visual processing deficits: " Educated pt on findings. May consider binasal occlusion in occupational therapy.   5. Photophobia: Prescribed tinted lenses (Transition XTRActive). Recommended occupational therapy for symptom management strategies.   6. History of retinal tear: continue to follow up with retinal specialist.     Assessment and Plan:  1) Regular astigmatism with presbyopia, both eyes  A/P: Updated PAL and computer glasses with habitual 2 BD prism right eye.  Monitor annually.    2) Esophoria, Right hyperphoria  A/P: Longstanding, stable.  Monitor annually.    3) H/o retinal tear, right eye.  A: s/p barricade laser.  Retina flat and intact 360 today.  P: Patient educated on condition.  Reviewed signs/symptoms of RD.  Instructed patient to seek care immediately in the event of flashes, shower of floaters, or curtain/veil over vision.  Monitor annually.    Complete documentation of historical and exam elements from today's encounter can be found in the full encounter summary report (not reduplicated in this progress note). I personally obtained the chief complaint(s) and history of present illness. I confirmed and edited as necessary the review of systems, past medical/surgical history, family history, social history, and examination findings as document by others; and I examined the patient myself. I personally reviewed the relevant tests, images, and reports as documented above. I formulated and edited as necessary the assessment and plan and discussed the findings and management plan with the patient and family.    Meagan Gomez, TONY

## 2024-08-04 ENCOUNTER — HEALTH MAINTENANCE LETTER (OUTPATIENT)
Age: 69
End: 2024-08-04

## 2025-08-16 ENCOUNTER — HEALTH MAINTENANCE LETTER (OUTPATIENT)
Age: 70
End: 2025-08-16